# Patient Record
Sex: FEMALE | Race: WHITE | NOT HISPANIC OR LATINO | Employment: OTHER | URBAN - METROPOLITAN AREA
[De-identification: names, ages, dates, MRNs, and addresses within clinical notes are randomized per-mention and may not be internally consistent; named-entity substitution may affect disease eponyms.]

---

## 2017-04-18 PROBLEM — C50.412 MALIGNANT NEOPLASM OF UPPER-OUTER QUADRANT OF LEFT FEMALE BREAST: Status: ACTIVE | Noted: 2017-04-18

## 2017-04-27 ENCOUNTER — TELEPHONE (OUTPATIENT)
Dept: PHARMACY | Facility: CLINIC | Age: 77
End: 2017-04-27

## 2017-05-01 NOTE — TELEPHONE ENCOUNTER
DOCUMENTATION ONLY  PA was submitted to ins  PA was approved until 4/25/2018    ASSISTANCE [employer]   ID: [id number]  GRP: [id number]  BIN: [id number]  PCN: [id number]  $15,000.00

## 2017-05-11 PROBLEM — N13.30 HYDRONEPHROSIS: Status: ACTIVE | Noted: 2017-05-11

## 2017-05-18 ENCOUNTER — OFFICE VISIT (OUTPATIENT)
Dept: VASCULAR SURGERY | Facility: CLINIC | Age: 77
End: 2017-05-18
Payer: MEDICARE

## 2017-05-18 VITALS
RESPIRATION RATE: 16 BRPM | HEART RATE: 86 BPM | HEIGHT: 61 IN | SYSTOLIC BLOOD PRESSURE: 121 MMHG | WEIGHT: 111 LBS | DIASTOLIC BLOOD PRESSURE: 74 MMHG | BODY MASS INDEX: 20.96 KG/M2

## 2017-05-18 DIAGNOSIS — I71.40 ABDOMINAL AORTIC ANEURYSM (AAA) WITHOUT RUPTURE: Primary | ICD-10-CM

## 2017-05-18 PROCEDURE — 99999 PR PBB SHADOW E&M-EST. PATIENT-LVL III: CPT | Mod: PBBFAC,,, | Performed by: THORACIC SURGERY (CARDIOTHORACIC VASCULAR SURGERY)

## 2017-05-18 PROCEDURE — 99205 OFFICE O/P NEW HI 60 MIN: CPT | Mod: S$PBB,,, | Performed by: THORACIC SURGERY (CARDIOTHORACIC VASCULAR SURGERY)

## 2017-05-18 PROCEDURE — 99213 OFFICE O/P EST LOW 20 MIN: CPT | Mod: PBBFAC,PO | Performed by: THORACIC SURGERY (CARDIOTHORACIC VASCULAR SURGERY)

## 2017-05-18 NOTE — PROGRESS NOTES
OFFICE VISIT NOTE    I was asked to see this patient by Dr. Collins.  The patient is a 76-year-old   female who was recently diagnosed with stage IV breast cancer.  The cancer has   invaded the skin in the left axilla and she is not a candidate for surgery at   this time.  A CT-PET scan was done and this revealed a 4.7 cm abdominal aortic   aneurysm.  The patient had been experiencing right-sided abdominal and back   pain.  CT scan showed a right hydronephrosis with what was thought to be a   ureteropelvic junction obstruction.  She was seen by a urologist who placed a   stent in the right ureter.  The pain has pretty much resolved.  She is to see   the urologist tomorrow to see whether she needs an operation.    PAST MEDICAL HISTORY:  Stage IV left breast carcinoma, hypertension, macular   degeneration, stroke, tobacco abuse.    PAST SURGICAL HISTORY:  Hysterectomy, cholecystectomy, appendectomy.    MEDICATIONS:  Norvasc, aspirin, Omnicef, Femara, Prinivil, Ibrance.    ALLERGIES:  Sulfa, codeine.    FAMILY HISTORY:  Abdominal aortic aneurysm in her sister, coronary artery   disease and diabetes mellitus.    SOCIAL HISTORY:  She used to smoke two packs of cigarettes per day, but is down   to one pack of cigarettes per day now.  She denies alcohol use.    REVISION OF SYSTEMS:  She states that she has a mass in the skin of the left   axilla overlying the tail of the breast.  She also complained of recent   right-sided abdominal and back pain.  She states that she has very little   appetite and is not eating much, but states that she has rarely weighed over 100   pounds throughout her life.  All other systems are reviewed and are negative.    PHYSICAL EXAMINATION:  VITAL SIGNS:  Blood pressure is 121/74, respiratory rate is 16, heart rate is   86, height 5 feet 1 inch, weight 111 pounds.  GENERAL:  She is awake and alert, in no apparent distress.  HEENT:  Head is normocephalic.  Pupils equal, round and reactive.   Sclerae   anicteric.  NECK:  Supple.  Trachea midline.  No masses.  LUNGS:  Clear.  BREASTS:  She has a mass approximately 4 cm in diameter overlying the tail of   the left breast and into the skin of the left axilla.  This mass looks like   multiple small masses invading the skin and is indurated and fixed to the chest   wall.  ABDOMEN:  Soft and nontender.  I can palpate an abdominal aortic aneurysm   measuring approximately 4 to 5 cm in diameter.  EXTREMITIES:  Warm with good capillary refill.  Pulse examination, 2+ brachial,   2+ radial pulses bilaterally.  She has 1+ right femoral pulse and a 2+ left   femoral pulse.  I cannot palpate dorsalis pedis, nor posterior tibial pulses in   either lower extremity.  NEUROLOGIC:  Awake, alert and oriented.  No lateralizing neurologic deficits.    CT scan showed a 4.7 cm abdominal aortic aneurysm.    IMPRESSION:  1.  Abdominal aortic aneurysm.  2.  Stage IV left breast cancer.  3.  Hypertension.  4.  Macular degeneration.  5.  History of cerebrovascular accident.  6.  Nicotine dependence.    RECOMMENDATIONS:  The patient has a 4.7 cm abdominal aortic aneurysm.  She also   has multiple other problems.  She had a right ureteropelvic junction obstruction   with hydronephrosis.  She recently was diagnosed with a urinary tract infection   and was treated with appropriate antibiotics.  A ureteral stent was placed and   the patient will be evaluated tomorrow to see whether she needs an operation.    She has stage IV left breast cancer.  At this point in time, I think that   everything else needs to be taken care of first.  The aneurysm is at a size   where it does not need to be addressed at this moment.  In this patient, I would   wait until it gets to around 5.5 as the [place] small abdominal aortic   aneurysm study and the study done here in the [place], showed that it is   safe to wait until aneurysms of both 5.5 cm.  The patient is certainly a very   poor  candidate.  We will get an ultrasound of the abdominal aorta in six months.      PRIYA  dd: 05/18/2017 18:00:35 (CDT)  td: 05/19/2017 14:32:48 (CDT)  Doc ID   #[id number]  Job ID #[id number]    CC:

## 2017-05-18 NOTE — MR AVS SNAPSHOT
[facility]  [address] [facility] [address] [place] [address]  Phone: [phone]                  Claudia Lerner   2017 3:00 PM   Office Visit    Description:  Female : 1940   Provider:  Atilio Portillo MD   Department:  [facility]           Reason for Visit     Aortic Aneurysm           Diagnoses this Visit        Comments    Abdominal aortic aneurysm (AAA) without rupture    -  Primary            To Do List           Goals (5 Years of Data)     None      [facility]     [facility] -  Assistance  Unless otherwise directed by your provider, please contact [facility], our nurse care line that is available for  assistance.     Registered nurses in the [facility] Center provide: appointment scheduling, clinical advisement, health education, and other advisory services.  Call: [phone] (toll free)               Medications           Message regarding Medications     Verify the changes and/or additions to your medication regime listed below are the same as discussed with your clinician today.  If any of these changes or additions are incorrect, please notify your healthcare provider.             Verify that the below list of medications is an accurate representation of the medications you are currently taking.  If none reported, the list may be blank. If incorrect, please contact your healthcare provider. Carry this list with you in case of emergency.           Current Medications     amlodipine (NORVASC) 10 MG tablet Take 10 mg by mouth once daily.    aspirin 81 MG Chew Take 81 mg by mouth once daily.    cefdinir (OMNICEF) 300 MG capsule TK 1 C PO Q 12 H FOR 7 DAYS    diazePAM (VALIUM) 2 MG tablet Take 1 tablet (2 mg total) by mouth once. Take before pet scan    letrozole (FEMARA) 2.5 mg Tab Take 1 tablet (2.5 mg total) by mouth once daily.    lisinopril (PRINIVIL,ZESTRIL) 5 MG tablet Take 5 mg by mouth once daily.     "palbociclib (IBRANCE) 125 mg Cap Take 1 capsule by mouth once daily. For 21 days, then take 7 days off           Clinical Reference Information           Your Vitals Were     BP Pulse Resp Height Weight BMI    121/74 86 16 5' 1" (1.549 m) 50.3 kg (111 lb) 20.97 kg/m2      Blood Pressure          Most Recent Value    BP  121/74      Allergies as of 5/18/2017     Sulfa (Sulfonamide Antibiotics)    Codeine      Immunizations Administered on Date of Encounter - 5/18/2017     None      [facility] Sign-Up     Activating your [facility] account is as easy as 1-2-3!     1) Visit [email or web address], select Sign Up Now, enter this activation code and your date of birth, then select Next.  [email or web address]  Expires: 6/2/2017  2:20 PM      2) Create a username and password to use when you visit [facility] in the future and select a security question in case you lose your password and select Next.    3) Enter your e-mail address and click Sign Up!    Additional Information  If you have questions, please e-mail [email or web address] or call [phone] to talk to our [facility] staff. Remember, [facility] is NOT to be used for urgent needs. For medical emergencies, dial 911.         Smoking Cessation     If you would like to quit smoking:   You may be eligible for free services if you are a [place] resident and started smoking cigarettes before September 1, 1988.  Call the Smoking Cessation Trust ([facility]) toll free at (453) 916-5258 or (161) 963-5425.   Call 1-800-QUIT-NOW if you do not meet the above criteria.   Contact us via email: [email or web address]   View our website for more information: [email or web address]        Language Assistance Services     ATTENTION: Language assistance services are available, free of charge. Please call [phone].      ATENCIÓN: Si habla español, tiene a livingston disposición servicios gratuitos de asistencia lingüística. Llame al [phone].     CHÚ Ý: N?u b?n nói Ti?ng Vi?t, có các d?ch " v? h? tr? ngôn ng? mi?n phí zebh cho b?n. G?i s? [phone].         [facility] [facility] complies with applicable Federal civil rights laws and does not discriminate on the basis of race, color, national origin, age, disability, or sex.

## 2017-05-18 NOTE — LETTER
May 18, 2017      Katlyn Collins MD  [address]  [address]  [facility] [address]           [place] [facility]  [address] [facility] [address] [address]  Phone: [phone]          Patient: Claudia Lerner   MR Number: [id number]   YOB: 1940   Date of Visit: 5/18/2017       Dear Dr. Katlny Collins:    Thank you for referring Claudia Lerner to me for evaluation. Attached you will find relevant portions of my assessment and plan of care.    If you have questions, please do not hesitate to call me. I look forward to following Claudia Lerner along with you.    Sincerely,    Atilio Portillo MD    Enclosure  CC:  No Recipients    If you would like to receive this communication electronically, please contact [email or web address] or [phone] to request more information on [employer] Link access.    For providers and/or their staff who would like to refer a patient to [facility], please contact us through our one-stop-shop provider referral line, [facility], at [phone].    If you feel you have received this communication in error or would no longer like to receive these types of communications, please e-mail [email or web address]

## 2017-05-25 DIAGNOSIS — I71.40 AAA (ABDOMINAL AORTIC ANEURYSM) WITHOUT RUPTURE: Primary | ICD-10-CM

## 2017-07-13 PROBLEM — F17.200 TOBACCO USE DISORDER: Status: ACTIVE | Noted: 2017-07-13

## 2017-07-13 PROBLEM — N28.9 RENAL INSUFFICIENCY: Status: ACTIVE | Noted: 2017-07-13

## 2017-07-13 PROBLEM — J96.01 ACUTE RESPIRATORY FAILURE WITH HYPOXEMIA: Status: ACTIVE | Noted: 2017-07-13

## 2017-07-13 PROBLEM — N81.10 BLADDER PROLAPSE, FEMALE, ACQUIRED: Status: ACTIVE | Noted: 2017-07-13

## 2017-07-13 PROBLEM — J96.01 ACUTE HYPOXEMIC RESPIRATORY FAILURE: Status: ACTIVE | Noted: 2017-07-13

## 2017-07-13 PROBLEM — I10 ESSENTIAL HYPERTENSION: Status: ACTIVE | Noted: 2017-07-13

## 2017-07-13 PROBLEM — D53.9 MACROCYTIC ANEMIA: Status: ACTIVE | Noted: 2017-07-13

## 2017-07-13 PROBLEM — J94.8 HYDROPNEUMOTHORAX: Status: ACTIVE | Noted: 2017-07-13

## 2017-07-13 PROBLEM — N18.30 STAGE 3 CHRONIC KIDNEY DISEASE: Status: ACTIVE | Noted: 2017-07-13

## 2017-07-16 PROBLEM — J93.9 PNEUMOTHORAX, LEFT: Status: ACTIVE | Noted: 2017-07-16

## 2017-07-19 ENCOUNTER — OFFICE VISIT (OUTPATIENT)
Dept: OBSTETRICS AND GYNECOLOGY | Facility: CLINIC | Age: 77
End: 2017-07-19
Payer: MEDICARE

## 2017-07-19 VITALS
BODY MASS INDEX: 20.62 KG/M2 | SYSTOLIC BLOOD PRESSURE: 96 MMHG | HEIGHT: 60 IN | WEIGHT: 105 LBS | DIASTOLIC BLOOD PRESSURE: 46 MMHG

## 2017-07-19 DIAGNOSIS — N81.10 VAGINAL PROLAPSE: Primary | ICD-10-CM

## 2017-07-19 PROCEDURE — 99205 OFFICE O/P NEW HI 60 MIN: CPT | Mod: 25,S$PBB,, | Performed by: SPECIALIST

## 2017-07-19 PROCEDURE — 1159F MED LIST DOCD IN RCRD: CPT | Mod: ,,, | Performed by: SPECIALIST

## 2017-07-19 PROCEDURE — 57160 INSERT PESSARY/OTHER DEVICE: CPT | Mod: PBBFAC,PN | Performed by: SPECIALIST

## 2017-07-19 PROCEDURE — 99213 OFFICE O/P EST LOW 20 MIN: CPT | Mod: PBBFAC,PN | Performed by: SPECIALIST

## 2017-07-19 PROCEDURE — 57160 INSERT PESSARY/OTHER DEVICE: CPT | Mod: S$PBB,,, | Performed by: SPECIALIST

## 2017-07-19 PROCEDURE — 1125F AMNT PAIN NOTED PAIN PRSNT: CPT | Mod: ,,, | Performed by: SPECIALIST

## 2017-07-19 PROCEDURE — 99999 PR PBB SHADOW E&M-EST. PATIENT-LVL III: CPT | Mod: PBBFAC,,, | Performed by: SPECIALIST

## 2017-07-19 RX ORDER — DORZOLAMIDE HYDROCHLORIDE AND TIMOLOL MALEATE 20; 5 MG/ML; MG/ML
SOLUTION/ DROPS OPHTHALMIC
Refills: 6 | COMMUNITY
Start: 2017-07-10 | End: 2017-07-20

## 2017-07-19 NOTE — PROGRESS NOTES
75 yo WF , h/o hysterectomy with diagnosed grade 3 vaginal prolapse and secondary urinary retention presents after recent hospital discharge for evaluation. Pearce cath in place.    Past Medical History:   Diagnosis Date    Adenocarcinoma     Hypertension     Macular degeneration     Stroke        Past Surgical History:   Procedure Laterality Date    APPENDECTOMY      CHOLECYSTECTOMY      HYSTERECTOMY         Family History   Problem Relation Age of Onset    Heart disease Mother     Heart disease Father     Cancer Father     Diabetes Father     Heart disease Sister     Aneurysm Son        Social History     Social History    Marital status:      Spouse name: N/A    Number of children: N/A    Years of education: N/A     Social History Main Topics    Smoking status: Current Every Day Smoker     Packs/day: 1.00    Smokeless tobacco: Never Used      Comment: denied TTS consult    Alcohol use No    Drug use: No    Sexual activity: Not Asked     Other Topics Concern    None     Social History Narrative    None       Current Outpatient Prescriptions   Medication Sig Dispense Refill    albuterol-ipratropium 2.5mg-0.5mg/3mL (DUO-NEB) 0.5 mg-3 mg(2.5 mg base)/3 mL nebulizer solution Take 3 mLs by nebulization every 6 (six) hours as needed for Wheezing. Rescue 120 vial 2    amlodipine (NORVASC) 10 MG tablet Take 10 mg by mouth once daily.      aspirin 81 MG Chew Take 81 mg by mouth once daily.      ibuprofen (ADVIL,MOTRIN) 800 MG tablet Take 800 mg by mouth every 6 (six) hours as needed for Pain.      letrozole (FEMARA) 2.5 mg Tab TAKE 1 TABLET(2.5 MG) BY MOUTH EVERY DAY 30 tablet 0    lisinopril (PRINIVIL,ZESTRIL) 5 MG tablet Take 5 mg by mouth once daily.      ondansetron (ZOFRAN-ODT) 8 MG TbDL Take 1 tablet (8 mg total) by mouth every 6 (six) hours as needed. 20 tablet 3    oxybutynin (DITROPAN) 5 MG Tab Take 5 mg by mouth 3 (three) times daily.      palbociclib (IBRANCE) 125 mg Cap  Take 1 capsule by mouth once daily. For 21 days, then take 7 days off 21 capsule 6    dorzolamide-timolol 2-0.5% (COSOPT) 22.3-6.8 mg/mL ophthalmic solution PLACE 1 DROP IN BOTH EYES BID  6     No current facility-administered medications for this visit.        Review of patient's allergies indicates:   Allergen Reactions    Sulfa (sulfonamide antibiotics) Other (See Comments)     unknown    Codeine Palpitations       Review of System:   General: no chills, fever, night sweats, weight gain or weight loss  Psychological: no depression or suicidal ideation  Breasts: no new or changing breast lumps, nipple discharge or masses.  Respiratory: no cough, shortness of breath, or wheezing  Cardiovascular: no chest pain or dyspnea on exertion  Gastrointestinal: no abdominal pain, change in bowel habits, or black or bloody stools  Genito-Urinary: COMPLETE VAGINAL PROLAPSE  Musculoskeletal: no gait disturbance or muscular weakness    PE:   APPEARANCE: Well nourished, well developed, in no acute distress.  NECK: Neck symmetric without masses or thyromegaly.  NODES: No inguinal lymph node enlargement.  ABDOMEN: Soft. No tenderness or masses. No hepatosplenomegaly. No hernias.  BREASTS: Symmetrical, no skin changes or visible lesions. No palpable masses, nipple discharge or adenopathy bilaterally.  PELVIC: Normal external female genitalia without lesions. Normal hair distribution. Adequate perineal body, normal urethral meatus. Vagina DRY AND ATROPHIC WITH COMPLETE VAGINAL VAULT PROLAPSE.    Procedure   Vaginal Pessary fitting    After informed consent, pt in supine position, 36 Ring pessary placed high vaginal vault after manual reduction of prolapsed vagina. Excellent fit with good and terior and vault support, Neg Valsava and pt states tolerable.    I discussed pessary maintinence with family members and pessary cleaning and replacement instructions given.  Cleaning instructions given    Ref [id number]  Lot [id number]    Plan Clean  and replace q 8 weeks  If not able to perform then RTO

## 2017-07-20 PROBLEM — E87.6 HYPOKALEMIA: Status: ACTIVE | Noted: 2017-07-20

## 2017-07-20 PROBLEM — I48.91 ATRIAL FIBRILLATION: Status: ACTIVE | Noted: 2017-07-20

## 2017-07-20 PROBLEM — I48.91 A-FIB: Status: ACTIVE | Noted: 2017-07-20

## 2017-07-21 PROBLEM — R53.1 WEAKNESS: Status: ACTIVE | Noted: 2017-07-21

## 2017-07-26 PROBLEM — I48.0 PAROXYSMAL ATRIAL FIBRILLATION: Status: ACTIVE | Noted: 2017-07-20

## 2017-07-27 ENCOUNTER — TELEPHONE (OUTPATIENT)
Dept: CARDIOLOGY | Facility: CLINIC | Age: 77
End: 2017-07-27

## 2017-07-27 NOTE — TELEPHONE ENCOUNTER
----- Message from Olegario Reyes sent at 7/27/2017  8:57 AM CDT -----  Contact: Floresita chao/Tierney  Pt needs to schedule a 6-week [facility] f/u appt  Call Back#[phone] ext [phone]  Thanks

## 2018-02-14 PROBLEM — R52 PAIN: Status: ACTIVE | Noted: 2018-02-14

## 2018-02-15 PROBLEM — A41.9 SEPSIS DUE TO URINARY TRACT INFECTION: Status: ACTIVE | Noted: 2018-02-15

## 2018-02-15 PROBLEM — N39.0 SEPSIS DUE TO URINARY TRACT INFECTION: Status: ACTIVE | Noted: 2018-02-15

## 2018-02-15 PROBLEM — I48.20 CHRONIC ATRIAL FIBRILLATION: Status: ACTIVE | Noted: 2017-07-20

## 2018-02-15 PROBLEM — N39.0 COMPLICATED UTI (URINARY TRACT INFECTION): Status: ACTIVE | Noted: 2018-02-15

## 2018-02-16 PROBLEM — D62 ACUTE BLOOD LOSS ANEMIA: Status: ACTIVE | Noted: 2018-02-16

## 2018-02-16 PROBLEM — N13.4: Status: ACTIVE | Noted: 2018-02-16

## 2018-03-16 PROBLEM — I73.9 PVD (PERIPHERAL VASCULAR DISEASE): Status: ACTIVE | Noted: 2018-03-16

## 2018-04-18 ENCOUNTER — OFFICE VISIT (OUTPATIENT)
Dept: VASCULAR SURGERY | Facility: CLINIC | Age: 78
End: 2018-04-18
Payer: MEDICARE

## 2018-04-18 VITALS
DIASTOLIC BLOOD PRESSURE: 67 MMHG | BODY MASS INDEX: 20.03 KG/M2 | SYSTOLIC BLOOD PRESSURE: 143 MMHG | HEART RATE: 57 BPM | WEIGHT: 102 LBS | HEIGHT: 60 IN

## 2018-04-18 DIAGNOSIS — I71.40 ABDOMINAL AORTIC ANEURYSM (AAA) WITHOUT RUPTURE: Primary | ICD-10-CM

## 2018-04-18 DIAGNOSIS — I74.5 ILIAC ARTERY OCCLUSION: ICD-10-CM

## 2018-04-18 DIAGNOSIS — I71.20 THORACIC AORTIC ANEURYSM WITHOUT RUPTURE: ICD-10-CM

## 2018-04-18 PROCEDURE — 99999 PR PBB SHADOW E&M-EST. PATIENT-LVL III: CPT | Mod: PBBFAC,,, | Performed by: THORACIC SURGERY (CARDIOTHORACIC VASCULAR SURGERY)

## 2018-04-18 PROCEDURE — 99213 OFFICE O/P EST LOW 20 MIN: CPT | Mod: PBBFAC,PO | Performed by: THORACIC SURGERY (CARDIOTHORACIC VASCULAR SURGERY)

## 2018-04-18 PROCEDURE — 99024 POSTOP FOLLOW-UP VISIT: CPT | Mod: POP,,, | Performed by: THORACIC SURGERY (CARDIOTHORACIC VASCULAR SURGERY)

## 2018-04-18 RX ORDER — CIPROFLOXACIN 500 MG/1
500 TABLET ORAL 2 TIMES DAILY
Refills: 1 | COMMUNITY
Start: 2018-04-13 | End: 2018-07-23

## 2018-04-18 RX ORDER — PHENYLPROPANOLAMINE/CLEMASTINE 75-1.34MG
TABLET, EXTENDED RELEASE ORAL
COMMUNITY
End: 2018-07-23

## 2018-04-18 RX ORDER — PHENAZOPYRIDINE HYDROCHLORIDE 200 MG/1
200 TABLET, FILM COATED ORAL 3 TIMES DAILY PRN
Refills: 6 | COMMUNITY
Start: 2018-04-04 | End: 2018-07-23

## 2018-04-19 NOTE — PROGRESS NOTES
OFFICE NOTE    HISTORY OF PRESENT ILLNESS:  This is a 77-year-old lady with an abdominal aortic   aneurysm, the size of which is unclear.  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  According to the   son, she was told that the aneurysm has grown to at least 5 cm in greatest   transverse dimension, but she does not have any details.  I have the angiogram,   but not the CT scan to my office.  Medicines are noted, they are part of the   recent EPIC record.  Her problem list is reviewed.    PHYSICAL EXAMINATION:  GENERAL:  She is awake, alert, in no distress.  She recently had a fracture of   the right upper extremity and this is being treated with a sling.  HEENT:  Pupils are equal, round, reactive to light.  Nose and throat are clear.  NECK:  Supple.  CHEST:  Clear to auscultation.  HEART:  Regular rate and rhythm.  ABDOMEN:  Benign.  EXTREMITIES:  Femoral pulses are diminished as well as pedal pulses.    The recent angiogram was reviewed.  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  She appears to   be asymptomatic.    PLAN:  At this point, I recommend CTA of the chest and abdomen and based on the   dimensions of the aneurysm will make further recommendations.      MANDIE  dd: 04/18/2018 14:22:13 (CDT)  td: 04/19/2018 03:59:23 (CDT)  Doc ID   #[id number]  Job ID #[id number]    CC:

## 2018-05-01 ENCOUNTER — TELEPHONE (OUTPATIENT)
Dept: VASCULAR SURGERY | Facility: CLINIC | Age: 78
End: 2018-05-01

## 2018-05-01 NOTE — TELEPHONE ENCOUNTER
Informed son we will call with surgical recommendations after review of study by Dr. Rincon and Endovascular rep. States understanding.

## 2018-05-01 NOTE — TELEPHONE ENCOUNTER
----- Message from Ana Cali sent at 5/1/2018  9:45 AM CDT -----  Contact: Floresita (Home Health Nurse)  Patient had a CT scan done on 04/24/18 at [facility] and is asking for results. Please advise.   Call back  or patient son  (Tej Lerner)  Thanks!

## 2018-07-24 ENCOUNTER — TELEPHONE (OUTPATIENT)
Dept: VASCULAR SURGERY | Facility: CLINIC | Age: 78
End: 2018-07-24

## 2018-07-27 PROBLEM — N13.30 HYDRONEPHROSIS OF RIGHT KIDNEY: Status: ACTIVE | Noted: 2018-07-27

## 2018-11-28 ENCOUNTER — TELEPHONE (OUTPATIENT)
Dept: ADMINISTRATIVE | Facility: CLINIC | Age: 78
End: 2018-11-28

## 2018-11-28 NOTE — TELEPHONE ENCOUNTER
Home Health Recert 11/22/2018 to 01/20/2019 with [facility], Dr Dion Aparicio.  service.

## 2019-02-04 ENCOUNTER — TELEPHONE (OUTPATIENT)
Dept: ADMINISTRATIVE | Facility: CLINIC | Age: 79
End: 2019-02-04

## 2019-02-04 NOTE — TELEPHONE ENCOUNTER
Home Health Recert with [facility]-Dr. Dion Aparicio. Pt received SN and PT services.

## 2019-06-17 ENCOUNTER — EXTERNAL HOME HEALTH (OUTPATIENT)
Dept: HOME HEALTH SERVICES | Facility: HOSPITAL | Age: 79
End: 2019-06-17

## 2020-05-26 ENCOUNTER — HOSPITAL ENCOUNTER (OUTPATIENT)
Dept: RADIOLOGY | Facility: HOSPITAL | Age: 80
Discharge: HOME | End: 2020-05-26
Attending: SPECIALIST
Payer: MEDICARE

## 2020-05-26 ENCOUNTER — HOSPITAL ENCOUNTER (OUTPATIENT)
Dept: PREADMISSION TESTING | Facility: HOSPITAL | Age: 80
Discharge: HOME | End: 2020-05-26
Attending: SPECIALIST
Payer: MEDICARE

## 2020-05-26 VITALS
HEART RATE: 56 BPM | DIASTOLIC BLOOD PRESSURE: 81 MMHG | SYSTOLIC BLOOD PRESSURE: 122 MMHG | OXYGEN SATURATION: 96 % | BODY MASS INDEX: 21.6 KG/M2 | RESPIRATION RATE: 18 BRPM | TEMPERATURE: 99 F | HEIGHT: 60 IN | WEIGHT: 110 LBS

## 2020-05-26 DIAGNOSIS — Z41.9 SURGERY, ELECTIVE: Primary | ICD-10-CM

## 2020-05-26 DIAGNOSIS — Z01.811 PRE-OP CHEST EXAM: ICD-10-CM

## 2020-05-26 DIAGNOSIS — Z01.811 PRE-OP CHEST EXAM: Primary | ICD-10-CM

## 2020-05-26 PROCEDURE — U0003 INFECTIOUS AGENT DETECTION BY NUCLEIC ACID (DNA OR RNA); SEVERE ACUTE RESPIRATORY SYNDROME CORONAVIRUS 2 (SARS-COV-2) (CORONAVIRUS DISEASE [COVID-19]), AMPLIFIED PROBE TECHNIQUE, MAKING USE OF HIGH THROUGHPUT TECHNOLOGIES AS DESCRIBED BY CMS-2020-01-R: HCPCS

## 2020-05-26 PROCEDURE — 71046 X-RAY EXAM CHEST 2 VIEWS: CPT | Mod: TC

## 2020-05-26 PROCEDURE — 93005 ELECTROCARDIOGRAM TRACING: CPT | Performed by: INTERNAL MEDICINE

## 2020-05-26 RX ORDER — CEFAZOLIN SODIUM 1 G/3ML
2 INJECTION, POWDER, FOR SOLUTION INTRAMUSCULAR; INTRAVENOUS ONCE
Status: CANCELLED | OUTPATIENT
Start: 2020-05-28

## 2020-05-27 LAB — SARS-COV-2 RNA RESP QL NAA+PROBE: NOT DETECTED

## 2020-05-28 ENCOUNTER — HOSPITAL ENCOUNTER (OUTPATIENT)
Dept: RADIOLOGY | Facility: HOSPITAL | Age: 80
Discharge: HOME | End: 2020-05-28
Attending: SPECIALIST | Admitting: SPECIALIST
Payer: MEDICARE

## 2020-05-28 ENCOUNTER — ANESTHESIA EVENT (OUTPATIENT)
Dept: SURGERY | Facility: HOSPITAL | Age: 80
End: 2020-05-28
Payer: MEDICARE

## 2020-05-28 ENCOUNTER — ANESTHESIA (OUTPATIENT)
Dept: SURGERY | Facility: HOSPITAL | Age: 80
End: 2020-05-28
Payer: MEDICARE

## 2020-05-28 ENCOUNTER — HOSPITAL ENCOUNTER (OUTPATIENT)
Facility: HOSPITAL | Age: 80
Discharge: HOME | End: 2020-05-28
Attending: SPECIALIST | Admitting: SPECIALIST
Payer: MEDICARE

## 2020-05-28 VITALS
WEIGHT: 110 LBS | OXYGEN SATURATION: 99 % | BODY MASS INDEX: 21.6 KG/M2 | RESPIRATION RATE: 17 BRPM | SYSTOLIC BLOOD PRESSURE: 149 MMHG | DIASTOLIC BLOOD PRESSURE: 78 MMHG | HEART RATE: 62 BPM | HEIGHT: 60 IN | TEMPERATURE: 98 F

## 2020-05-28 DIAGNOSIS — R10.9 PAIN IN THE ABDOMEN: ICD-10-CM

## 2020-05-28 DIAGNOSIS — Z41.9 SURGERY, ELECTIVE: ICD-10-CM

## 2020-05-28 DIAGNOSIS — N13.30 HYDRONEPHROSIS, UNSPECIFIED HYDRONEPHROSIS TYPE: Primary | ICD-10-CM

## 2020-05-28 PROCEDURE — 71000015 HC POSTOP RECOV 1ST HR: Performed by: SPECIALIST

## 2020-05-28 PROCEDURE — S0028 INJECTION, FAMOTIDINE, 20 MG: HCPCS | Performed by: NURSE ANESTHETIST, CERTIFIED REGISTERED

## 2020-05-28 PROCEDURE — 37000009 HC ANESTHESIA EA ADD 15 MINS: Performed by: SPECIALIST

## 2020-05-28 PROCEDURE — 27202107 HC XP QUATRO SENSOR: Performed by: ANESTHESIOLOGY

## 2020-05-28 PROCEDURE — 27201423 OPTIME MED/SURG SUP & DEVICES STERILE SUPPLY: Performed by: SPECIALIST

## 2020-05-28 PROCEDURE — 27000673 HC TUBING BLOOD Y: Performed by: ANESTHESIOLOGY

## 2020-05-28 PROCEDURE — 76000 FLUOROSCOPY <1 HR PHYS/QHP: CPT | Mod: TC

## 2020-05-28 PROCEDURE — 25000003 PHARM REV CODE 250: Performed by: NURSE ANESTHETIST, CERTIFIED REGISTERED

## 2020-05-28 PROCEDURE — C1769 GUIDE WIRE: HCPCS | Performed by: SPECIALIST

## 2020-05-28 PROCEDURE — 36000707: Performed by: SPECIALIST

## 2020-05-28 PROCEDURE — 25000003 PHARM REV CODE 250: Performed by: ANESTHESIOLOGY

## 2020-05-28 PROCEDURE — 27000671 HC TUBING MICROBORE EXT: Performed by: ANESTHESIOLOGY

## 2020-05-28 PROCEDURE — 63600175 PHARM REV CODE 636 W HCPCS: Performed by: NURSE ANESTHETIST, CERTIFIED REGISTERED

## 2020-05-28 PROCEDURE — 27200651 HC AIRWAY, LMA: Performed by: ANESTHESIOLOGY

## 2020-05-28 PROCEDURE — 63600175 PHARM REV CODE 636 W HCPCS: Performed by: SPECIALIST

## 2020-05-28 PROCEDURE — 25500020 PHARM REV CODE 255: Performed by: SPECIALIST

## 2020-05-28 PROCEDURE — 37000008 HC ANESTHESIA 1ST 15 MINUTES: Performed by: SPECIALIST

## 2020-05-28 PROCEDURE — 25000003 PHARM REV CODE 250: Performed by: SPECIALIST

## 2020-05-28 PROCEDURE — 71000033 HC RECOVERY, INTIAL HOUR: Performed by: SPECIALIST

## 2020-05-28 PROCEDURE — C2617 STENT, NON-COR, TEM W/O DEL: HCPCS | Performed by: SPECIALIST

## 2020-05-28 PROCEDURE — 36000706: Performed by: SPECIALIST

## 2020-05-28 DEVICE — STENT URETERAL FIRM 4.8FX26 ASCERTA: Type: IMPLANTABLE DEVICE | Site: URETER | Status: FUNCTIONAL

## 2020-05-28 RX ORDER — LIDOCAINE HYDROCHLORIDE 20 MG/ML
JELLY TOPICAL
Status: DISCONTINUED | OUTPATIENT
Start: 2020-05-28 | End: 2020-05-28 | Stop reason: HOSPADM

## 2020-05-28 RX ORDER — PROPOFOL 10 MG/ML
VIAL (ML) INTRAVENOUS
Status: DISCONTINUED | OUTPATIENT
Start: 2020-05-28 | End: 2020-05-28

## 2020-05-28 RX ORDER — LIDOCAINE HYDROCHLORIDE 20 MG/ML
JELLY TOPICAL
Status: DISCONTINUED | OUTPATIENT
Start: 2020-05-28 | End: 2020-05-28

## 2020-05-28 RX ORDER — DEXAMETHASONE SODIUM PHOSPHATE 4 MG/ML
INJECTION, SOLUTION INTRA-ARTICULAR; INTRALESIONAL; INTRAMUSCULAR; INTRAVENOUS; SOFT TISSUE
Status: DISCONTINUED | OUTPATIENT
Start: 2020-05-28 | End: 2020-05-28

## 2020-05-28 RX ORDER — OXYCODONE HYDROCHLORIDE 5 MG/1
5 TABLET ORAL
Status: DISCONTINUED | OUTPATIENT
Start: 2020-05-28 | End: 2020-05-28 | Stop reason: HOSPADM

## 2020-05-28 RX ORDER — ACETAMINOPHEN 325 MG/1
650 TABLET ORAL ONCE
Status: COMPLETED | OUTPATIENT
Start: 2020-05-28 | End: 2020-05-28

## 2020-05-28 RX ORDER — DIPHENHYDRAMINE HYDROCHLORIDE 50 MG/ML
12.5 INJECTION INTRAMUSCULAR; INTRAVENOUS
Status: DISCONTINUED | OUTPATIENT
Start: 2020-05-28 | End: 2020-05-28 | Stop reason: HOSPADM

## 2020-05-28 RX ORDER — LIDOCAINE HYDROCHLORIDE 20 MG/ML
INJECTION, SOLUTION EPIDURAL; INFILTRATION; INTRACAUDAL; PERINEURAL
Status: DISCONTINUED | OUTPATIENT
Start: 2020-05-28 | End: 2020-05-28

## 2020-05-28 RX ORDER — SODIUM CHLORIDE, SODIUM LACTATE, POTASSIUM CHLORIDE, CALCIUM CHLORIDE 600; 310; 30; 20 MG/100ML; MG/100ML; MG/100ML; MG/100ML
INJECTION, SOLUTION INTRAVENOUS CONTINUOUS PRN
Status: DISCONTINUED | OUTPATIENT
Start: 2020-05-28 | End: 2020-05-28

## 2020-05-28 RX ORDER — SODIUM CHLORIDE 0.9 % (FLUSH) 0.9 %
10 SYRINGE (ML) INJECTION
Status: DISCONTINUED | OUTPATIENT
Start: 2020-05-28 | End: 2020-05-28 | Stop reason: HOSPADM

## 2020-05-28 RX ORDER — FAMOTIDINE 10 MG/ML
INJECTION INTRAVENOUS
Status: DISCONTINUED | OUTPATIENT
Start: 2020-05-28 | End: 2020-05-28

## 2020-05-28 RX ORDER — ONDANSETRON 2 MG/ML
INJECTION INTRAMUSCULAR; INTRAVENOUS
Status: DISCONTINUED | OUTPATIENT
Start: 2020-05-28 | End: 2020-05-28

## 2020-05-28 RX ORDER — FENTANYL CITRATE 50 UG/ML
25 INJECTION, SOLUTION INTRAMUSCULAR; INTRAVENOUS
Status: DISCONTINUED | OUTPATIENT
Start: 2020-05-28 | End: 2020-05-28 | Stop reason: HOSPADM

## 2020-05-28 RX ORDER — CEFAZOLIN SODIUM 2 G/50ML
2 SOLUTION INTRAVENOUS
Status: COMPLETED | OUTPATIENT
Start: 2020-05-28 | End: 2020-05-28

## 2020-05-28 RX ORDER — CEFAZOLIN SODIUM 1 G/3ML
2 INJECTION, POWDER, FOR SOLUTION INTRAMUSCULAR; INTRAVENOUS ONCE
Status: DISCONTINUED | OUTPATIENT
Start: 2020-05-28 | End: 2020-05-28

## 2020-05-28 RX ORDER — ONDANSETRON 2 MG/ML
4 INJECTION INTRAMUSCULAR; INTRAVENOUS DAILY PRN
Status: DISCONTINUED | OUTPATIENT
Start: 2020-05-28 | End: 2020-05-28 | Stop reason: HOSPADM

## 2020-05-28 RX ORDER — FENTANYL CITRATE 50 UG/ML
INJECTION, SOLUTION INTRAMUSCULAR; INTRAVENOUS
Status: DISCONTINUED | OUTPATIENT
Start: 2020-05-28 | End: 2020-05-28

## 2020-05-28 RX ORDER — TRAMADOL HYDROCHLORIDE 50 MG/1
50 TABLET ORAL EVERY 12 HOURS PRN
Qty: 10 TABLET | Refills: 0
Start: 2020-05-28 | End: 2020-10-26

## 2020-05-28 RX ADMIN — LIDOCAINE HYDROCHLORIDE 5 ML: 20 JELLY TOPICAL at 01:05

## 2020-05-28 RX ADMIN — ACETAMINOPHEN 650 MG: 325 TABLET ORAL at 01:05

## 2020-05-28 RX ADMIN — PROPOFOL 80 MG: 10 INJECTION, EMULSION INTRAVENOUS at 01:05

## 2020-05-28 RX ADMIN — ONDANSETRON 4 MG: 2 INJECTION INTRAMUSCULAR; INTRAVENOUS at 01:05

## 2020-05-28 RX ADMIN — DEXAMETHASONE SODIUM PHOSPHATE 4 MG: 4 INJECTION, SOLUTION INTRAMUSCULAR; INTRAVENOUS at 01:05

## 2020-05-28 RX ADMIN — CEFAZOLIN SODIUM 2 G: 2 SOLUTION INTRAVENOUS at 01:05

## 2020-05-28 RX ADMIN — LIDOCAINE HYDROCHLORIDE 60 MG: 20 INJECTION, SOLUTION EPIDURAL; INFILTRATION; INTRACAUDAL; PERINEURAL at 01:05

## 2020-05-28 RX ADMIN — FENTANYL CITRATE 25 MCG: 50 INJECTION INTRAMUSCULAR; INTRAVENOUS at 01:05

## 2020-05-28 RX ADMIN — FAMOTIDINE 20 MG: 10 INJECTION, SOLUTION INTRAVENOUS at 01:05

## 2020-05-28 RX ADMIN — SODIUM CHLORIDE, SODIUM LACTATE, POTASSIUM CHLORIDE, AND CALCIUM CHLORIDE: .6; .31; .03; .02 INJECTION, SOLUTION INTRAVENOUS at 01:05

## 2020-05-28 NOTE — PLAN OF CARE
Transported to Room 1551 in [facility] in stable condition.  Denies pain.  Daughter at bedside.  Report given to KATHI Christianson

## 2020-05-28 NOTE — PLAN OF CARE
Arrived in PACU s/p cysto with retrograde pyelogram and stent exchange on right.  Pt arouses to verbals.  Denies pain.

## 2020-05-28 NOTE — H&P
[facility]  History & Physical    SUBJECTIVE:     Chief Complaint/Reason for Admission:     History of Present Illness:  Patient is a 79 y.o. female presents with history of right-sided hydronephrosis  She has had a long-term stent by 2 other urologists    Here today for stent change    Also do a retrograde pyelogram    PTA Medications   Medication Sig    letrozole (FEMARA) 2.5 mg Tab TAKE 1 TABLET(2.5 MG) BY MOUTH  Daily (Patient taking differently: 2.5 mg once daily TAKE 1 TABLET(2.5 MG) BY MOUTH  Daily.)    MULTIVIT WITH MINERALS/LUTEIN (MULTIVITAMIN 50 PLUS ORAL) Take by mouth every evening.     sodium bicarbonate 650 MG tablet Take 650 mg by mouth 2 (two) times daily.     timolol maleate 0.25% (TIMOPTIC) 0.25 % Drop 1 drop 2 (two) times daily.    ALPRAZolam (XANAX) 0.25 MG tablet Take 1 tablet (0.25 mg total) by mouth daily as needed for Anxiety.    aspirin 81 MG Chew Take 81 mg by mouth once daily.    clopidogrel (PLAVIX) 75 mg tablet Take 75 mg by mouth once daily.    dronedarone (MULTAQ) 400 mg Tab Take 1 tablet (400 mg total) by mouth 2 (two) times daily.    oxybutynin (DITROPAN) 5 MG Tab Take 1 tablet (5 mg total) by mouth once daily.    pravastatin (PRAVACHOL) 40 MG tablet Take 1 tablet (40 mg total) by mouth once daily.       Review of patient's allergies indicates:   Allergen Reactions    Sulfa (sulfonamide antibiotics) Other (See Comments)     unknown    Codeine Palpitations       Past Medical History:   Diagnosis Date    A-fib 2017    history    AAA (abdominal aortic aneurysm) 2017    5.2 cm    Acquired hydroureter 2018    Adenocarcinoma 2017    left breast cancer , metastatic no chemo or surgery    Anemia     Aneurysm of thoracic aorta 03/16/2018    Carotid stenosis 2018    Cataract     COPD (chronic obstructive pulmonary disease) 2017    emphysema    Diarrhea     Fatigue     Glaucoma 2015    History of prolapse of bladder 2017    Hydronephrosis 2017     Hydropneumothorax 2017    LEFT    Hyperlipidemia 2019    Hypertension 2017    ovarian mass was removed    Hypokalemia     Hypotension     Kidney mass 2017    Macular degeneration 2011    PAD (peripheral artery disease)     PVD (peripheral vascular disease) 2018    Renal insufficiency 2017    Sepsis due to urinary tract infection 2017    Stroke 2017    no resisdual    Ureteral obstruction, right 2017    Urinary incontinence 2005    UTI (urinary tract infection)      Past Surgical History:   Procedure Laterality Date    aortagram with runoff  03/16/2018    APPENDECTOMY  1965    CHOLECYSTECTOMY  1980    CYSTOSCOPY W/ URETERAL STENT PLACEMENT Right 7/27/2018    Procedure: CYSTOSCOPY, WITH URETERAL STENT INSERTION;  Surgeon: Tremaine Victor MD;  Location: [facility]  Service: Urology;  Laterality: Right;  NORI VIDEO CONFIRMED 7/10/DME    CYSTOSCOPY W/ URETERAL STENT PLACEMENT Right 11/16/2018    Procedure: CYSTOSCOPY, WITH URETERAL STENT INSERTION;  Surgeon: Tremaine Victor MD;  Location: [facility] OR;  Service: Urology;  Laterality: Right;  STYKER CONFIRMED 11/7/DME    CYSTOSCOPY W/ URETERAL STENT PLACEMENT Right 5/24/2019    Procedure: CYSTOSCOPY, WITH URETERAL STENT INSERTION;  Surgeon: Tremaine Victor MD;  Location: [facility]  Service: Urology;  Laterality: Right;  nori video confirmed 5/17/dme    CYSTOSCOPY W/ URETERAL STENT PLACEMENT Right 9/6/2019    Procedure: CYSTOSCOPY, WITH URETERAL STENT INSERTION;  Surgeon: Tremaine Victor MD;  Location: [facility] OR;  Service: Urology;  Laterality: Right;    CYSTOSCOPY W/ URETERAL STENT PLACEMENT Right 12/6/2019    Procedure: CYSTOSCOPY, WITH URETERAL STENT INSERTION;  Surgeon: Tremaine Victor MD;  Location: [facility] OR;  Service: Urology;  Laterality: Right;  NORI VIDEO CONFIRMED DME    CYSTOSCOPY W/ URETERAL STENT REMOVAL Right 11/16/2018    Procedure: CYSTOSCOPY, WITH URETERAL STENT REMOVAL;  Surgeon: Tremaine Victor MD;  Location: [facility] OR;  Service:  Urology;  Laterality: Right;    CYSTOSCOPY W/ URETERAL STENT REMOVAL Right 5/24/2019    Procedure: CYSTOSCOPY, WITH URETERAL STENT REMOVAL;  Surgeon: Tremaine Victor MD;  Location: [facility] OR;  Service: Urology;  Laterality: Right;    CYSTOSCOPY W/ URETERAL STENT REMOVAL Right 9/6/2019    Procedure: CYSTOSCOPY, WITH URETERAL STENT REMOVAL;  Surgeon: Tremaine Victor MD;  Location: [facility] OR;  Service: Urology;  Laterality: Right;    CYSTOSCOPY W/ URETERAL STENT REMOVAL Right 12/6/2019    Procedure: CYSTOSCOPY, WITH URETERAL STENT REMOVAL;  Surgeon: Tremaine Victor MD;  Location: [facility] OR;  Service: Urology;  Laterality: Right;    cystoscopy with stent      HYSTERECTOMY  1965     Family History   Problem Relation Age of Onset    Heart disease Mother     Heart disease Father     Cancer Father     Diabetes Father     Heart disease Sister     Aneurysm Son      Social History     Tobacco Use    Smoking status: Former Smoker     Packs/day: 1.00     Types: Cigarettes     Last attempt to quit: 2017     Years since quitting: 3.4    Smokeless tobacco: Never Used    Tobacco comment: denied TTS consult   Substance Use Topics    Alcohol use: No    Drug use: No        Review of Systems:  Negative    OBJECTIVE:     Vital Signs (Most Recent):  Temp: 97.9 °F (36.6 °C) (05/28/20 1106)  Pulse: 70 (05/28/20 1106)  Resp: 18 (05/28/20 1106)  BP: (!) 157/75 (05/28/20 1106)  SpO2: 99 % (05/28/20 1106)    Inpatient Medications:  Current Facility-Administered Medications   Medication Dose Route Frequency Provider Last Rate Last Dose    cefazolin (ANCEF) 2 gram in dextrose 5% 50 mL IVPB (premix)  2 g Intravenous Once Pre-Op Mario Hernandez MD         Facility-Administered Medications Ordered in Other Encounters   Medication Dose Route Frequency Provider Last Rate Last Dose    lactated ringers infusion   Intravenous Continuous Tremaine Victor MD 75 mL/hr at 05/24/19 0730      lactated ringers infusion   Intravenous  Continuous Tremaine Victor MD        sodium chloride 0.9% flush 3 mL  3 mL Intravenous PRN Tremaine Victor MD        sodium chloride 0.9% flush 3 mL  3 mL Intravenous PRN Tremaine Victor MD              ASSESSMENT/PLAN:       Right side hydronephrosis      Patient has a stent      Cystoscopy with retrograde pyelogram

## 2020-05-28 NOTE — ANESTHESIA PROCEDURE NOTES
Intubation  Performed by: Seamus Rivas CRNA  Authorized by: Neftali Pérez MD     Intubation:     Induction:  Intravenous    Intubated:  Postinduction    Mask Ventilation:  N/a    Attempts:  1    Attempted By:  CRNA    Method of Intubation:  Blind intubation    Difficult Airway Encountered?: No      Complications:  None    Airway Device:  Supraglottic airway/LMA    Airway Device Size:  3.0    Style/Cuff Inflation:  Cuffed    Secured at:  The lips    Placement Verified By:  Capnometry    Complicating Factors:  None    Findings Post-Intubation:  BS equal bilateral

## 2020-05-28 NOTE — ANESTHESIA PREPROCEDURE EVALUATION
05/28/2020  Claudia Lerner is a 79 y.o., female.    Patient Active Problem List   Diagnosis    Malignant neoplasm of upper-outer quadrant of left female breast    Hydronephrosis    Acute hypoxemic respiratory failure    Hydropneumothorax    Renal insufficiency    Bladder prolapse, female, acquired    Essential hypertension    Macrocytic anemia    Tobacco use disorder    Acute respiratory failure with hypoxemia    Pneumothorax, left    Chronic atrial fibrillation    Hypokalemia    Paroxysmal atrial fibrillation    Weakness    Intractable pain    Sepsis due to urinary tract infection    Complicated UTI (urinary tract infection)    Acquired hydroureter    Acute blood loss anemia    PVD (peripheral vascular disease)    Hydronephrosis of right kidney       Past Surgical History:   Procedure Laterality Date    aortagram with runoff  03/16/2018    APPENDECTOMY  1965    CHOLECYSTECTOMY  1980    CYSTOSCOPY W/ URETERAL STENT PLACEMENT Right 7/27/2018    Procedure: CYSTOSCOPY, WITH URETERAL STENT INSERTION;  Surgeon: Tremaine Victor MD;  Location: [facility];  Service: Urology;  Laterality: Right;  NORI VIDEO CONFIRMED 7/10/DME    CYSTOSCOPY W/ URETERAL STENT PLACEMENT Right 11/16/2018    Procedure: CYSTOSCOPY, WITH URETERAL STENT INSERTION;  Surgeon: Tremaine Victor MD;  Location: [facility] OR;  Service: Urology;  Laterality: Right;  STYKER CONFIRMED 11/7/DME    CYSTOSCOPY W/ URETERAL STENT PLACEMENT Right 5/24/2019    Procedure: CYSTOSCOPY, WITH URETERAL STENT INSERTION;  Surgeon: Tremaine Victor MD;  Location: [facility] OR;  Service: Urology;  Laterality: Right;  nori video confirmed 5/17/dme    CYSTOSCOPY W/ URETERAL STENT PLACEMENT Right 9/6/2019    Procedure: CYSTOSCOPY, WITH URETERAL STENT INSERTION;  Surgeon: Tremaine Victor MD;  Location: [facility] OR;  Service: Urology;  Laterality:  Right;    CYSTOSCOPY W/ URETERAL STENT PLACEMENT Right 12/6/2019    Procedure: CYSTOSCOPY, WITH URETERAL STENT INSERTION;  Surgeon: Tremaine Victor MD;  Location: [facility] OR;  Service: Urology;  Laterality: Right;  NORI VIDEO CONFIRMED DME    CYSTOSCOPY W/ URETERAL STENT REMOVAL Right 11/16/2018    Procedure: CYSTOSCOPY, WITH URETERAL STENT REMOVAL;  Surgeon: Tremaine Victor MD;  Location: [facility] OR;  Service: Urology;  Laterality: Right;    CYSTOSCOPY W/ URETERAL STENT REMOVAL Right 5/24/2019    Procedure: CYSTOSCOPY, WITH URETERAL STENT REMOVAL;  Surgeon: Tremaine Victor MD;  Location: [facility] OR;  Service: Urology;  Laterality: Right;    CYSTOSCOPY W/ URETERAL STENT REMOVAL Right 9/6/2019    Procedure: CYSTOSCOPY, WITH URETERAL STENT REMOVAL;  Surgeon: Tremaine Victor MD;  Location: [facility] OR;  Service: Urology;  Laterality: Right;    CYSTOSCOPY W/ URETERAL STENT REMOVAL Right 12/6/2019    Procedure: CYSTOSCOPY, WITH URETERAL STENT REMOVAL;  Surgeon: Tremaine Victor MD;  Location: [facility] OR;  Service: Urology;  Laterality: Right;    cystoscopy with stent      HYSTERECTOMY  1965        Tobacco Use:  The patient  reports that she quit smoking about 3 years ago. Her smoking use included cigarettes. She smoked 1.00 pack per day. She has never used smokeless tobacco.     Results for orders placed or performed during the hospital encounter of 05/26/20   EKG 12-lead    Collection Time: 05/26/20  1:21 PM    Narrative    Test Reason : Z01.811,    Vent. Rate : 051 BPM     Atrial Rate : 051 BPM     P-R Int : 150 ms          QRS Dur : 074 ms      QT Int : 470 ms       P-R-T Axes : 046 -47 065 degrees     QTc Int : 433 ms    Sinus bradycardia  Left axis deviation  Nonspecific T wave abnormality  Abnormal ECG  No previous ECGs available  Confirmed by Mekhi Bro MD ([id number]) on 5/27/2020 9:17:20 PM    Referred By: GRAYSON ROSAS           Confirmed By:Mekhi Bro MD             Lab Results   Component Value Date    WBC  5.63 05/26/2020    HGB 10.9 (L) 05/26/2020    HCT 34.8 (L) 05/26/2020     (H) 05/26/2020     05/26/2020     BMP  Lab Results   Component Value Date     05/26/2020    K 3.6 05/26/2020     05/26/2020    CO2 22 (L) 05/26/2020    BUN 28 (H) 05/26/2020    CREATININE 1.6 (H) 05/26/2020    CALCIUM 9.3 05/26/2020    ANIONGAP 10 05/26/2020    ESTGFRAFRICA 34.6 (A) 05/26/2020    EGFRNONAA 30.0 (A) 05/26/2020             Pre-op Assessment    I have reviewed the Patient Summary Reports.     I have reviewed the Nursing Notes. I have reviewed the NPO Status.   I have reviewed the Medications.     Review of Systems  Anesthesia Hx:  Hx of Anesthetic complications (h/o disorientation once)  Denies Family Hx of Anesthesia complications.  Personal Hx of Anesthesia complications, Post-Operative Nausea/Vomiting, in the past, but not with recent anesthetics / prophylaxis   Social:  Former Smoker    Hematology/Oncology:  Hematology Normal   Oncology Normal     EENT/Dental:   Glaucoma and mac degen     Cardiovascular:   Hypertension Dysrhythmias atrial fibrillation PVD hyperlipidemia  Functional Capacity unable to determine   Denies Congenital Heart Disease.    Denies Congenital Heart Disease.   Denies Deep Venous Thrombosis (DVT)  Hypertension    Pulmonary:   COPD, moderate  Chronic Obstructive Pulmonary Disease (COPD):    Renal/:   Chronic Renal Disease, CRI renal calculi  Kidney Function/Disease, Chronic Kidney Disease (CKD)    Hepatic/GI:  Denies Liver Disease    Musculoskeletal:  Musculoskeletal Normal  Denies Rheumatic Disease    Neurological:   CVA, no residual symptoms  Denies Dx of Headaches Denies Seizure Disorder  CVA - Cerebrovasular Accident    Endocrine:  Endocrine Normal  Denies Diabetes    Psych:  Psychiatric Normal           Physical Exam  General:  Well nourished    Airway/Jaw/Neck:  Airway Findings: Mouth Opening: Normal General Airway Assessment: Adult  Mallampati: I  TM Distance: Normal, at  least 6 cm       Chest/Lungs:  Chest/Lungs Findings: Clear to auscultation     Heart/Vascular:  Heart Findings: Rhythm: Occasional Prematures        Mental Status:  Mental Status Findings:  Cooperative, Alert and Oriented         Anesthesia Plan  Type of Anesthesia, risks & benefits discussed:  Anesthesia Type:  general  Patient's Preference:   Intra-op Monitoring Plan: standard ASA monitors  Intra-op Monitoring Plan Comments:   Post Op Pain Control Plan: multimodal analgesia  Post Op Pain Control Plan Comments:   Induction:   IV  Beta Blocker:  Patient is not currently on a Beta-Blocker (No further documentation required).       Informed Consent: Patient understands risks and agrees with Anesthesia plan.  Questions answered. Anesthesia consent signed with patient.  ASA Score: 4     Day of Surgery Review of History & Physical: I have interviewed and examined the patient. I have reviewed the patient's H&P dated:    H&P update referred to the surgeon.     Anesthesia Plan Notes: LMA  No Versed and minimal fentanyl  Zofran, Pepcid, Decadron 4mg  Tylenol given        Ready For Surgery From Anesthesia Perspective.

## 2020-05-28 NOTE — OP NOTE
Operative Note         SUMMARY     Surgery Date:  5/28/2020     Assistant:     Indications:  This an 79-year-old female with a long history of right ureteral stricture  She had been treated by multiple urologists  Here for stent change    Pre-op Diagnosis:   Right ureteral stricture    Post-op Diagnosis:  Same    Procedure:  Cystoscopy with retrograde pyelogram and stent exchange    Anesthesia: General    Description of Procedure:   Patient brought to cystoscopy suite.  Placed in the cystoscopy position.  Patient is prepped and draped.  Anesthesia is given.  We enter the urinary bladder with the 21 fr cystoscope.  No lesions were found within the urinary bladder  The bladder appears to be within normal limits    Urinary orifices are normal  The stent was removed  We inject contrast into the right ureter  We observe what appears to be a poorly draining right kidney secondary to ureteral stricture  Glidewire is placed  We then float a 5 Kinyarwanda stent easily  Without any trouble whatsoever  This point telescope was removed  She is taken to recovery in satisfactory condition    Findings/Key Components:      Successful stent exchange    Estimated Blood Loss:      None

## 2020-05-28 NOTE — DISCHARGE SUMMARY
Patient comes in for routine cystoscopic stent exchange  Everything goes well without any problems whatsoever    Procedure is done w no complication    After a brief stay in recovery, patient is discharged in good condition    Meds given:  Ultram    F/u office:  3 weeks

## 2020-05-28 NOTE — TRANSFER OF CARE
Anesthesia Transfer of Care Note    Patient: Claudia Lerner    Procedure(s) Performed: Procedure(s) (LRB):  CYSTOSCOPY, WITH RETROGRADE PYELOGRAM (Right)  CYSTOSCOPY, WITH URETERAL STENT REMOVAL (Right)  CYSTOSCOPY, WITH URETERAL STENT INSERTION (Right)    Patient location: PACU    Anesthesia Type: general    Transport from OR: Transported from OR on room air with adequate spontaneous ventilation    Post pain: adequate analgesia    Post assessment: no apparent anesthetic complications    Post vital signs: stable    Level of consciousness: awake and alert    Nausea/Vomiting: no nausea/vomiting    Complications: none    Transfer of care protocol was followed      Last vitals:   Visit Vitals  BP (!) 157/75 (BP Location: Right arm, Patient Position: Sitting)   Pulse 70   Temp 36.6 °C (97.9 °F) (Oral)   Resp 18   Ht 5' (1.524 m)   Wt 49.9 kg (110 lb)   LMP  (LMP Unknown)   SpO2 99%   Breastfeeding? No   BMI 21.48 kg/m²

## 2020-05-28 NOTE — DISCHARGE INSTRUCTIONS
Cystoscopy    Cystoscopy is a procedure that lets your doctor look directly inside your urethra and bladder. It can be used to:  · Help diagnose a problem with your urethra, bladder, or kidneys.  · Take a sample (biopsy) of bladder or urethral tissue.  · Treat certain problems (such as removing kidney stones).  · Place a stent to bypass an obstruction.  · Take special X-rays of the kidneys.  Based on the findings, your doctor may recommend other tests or treatments.  What is a cystoscope?  A cystoscope is a telescope-like instrument that contains lenses and fiberoptics (small glass wires that make bright light). The cystoscope may be straight and rigid, or flexible to bend around curves in the urethra. The doctor may look directly into the cystoscope, or project the image onto a monitor.  Getting ready  · Ask your doctor if you should stop taking any medicines before the procedure.  · Ask whether you should avoid eating or drinking anything after midnight before the procedure.  · Follow any other instructions your doctor gives you.  Tell your doctor before the exam if you:  · Take any medicines, such as aspirin or blood thinners  · Have allergies to any medicines  · Are pregnant   The procedure  Cystoscopy is done in the doctors office, surgery center, or hospital. The doctor and a nurse are present during the procedure. It takes only a few minutes, longer if a biopsy, X-ray, or treatment needs to be done.  During the procedure:  · You lie on an exam table on your back, knees bent and legs apart. You are covered with a drape.  · Your urethra and the area around it are washed. Anesthetic jelly may be applied to numb the urethra. Other pain medicine is usually not needed. In some cases, you may be offered a mild sedative to help you relax. If a more extensive procedure is to be done, such as a biopsy or kidney stone removal, general anesthesia may be needed.  · The cystoscope is inserted. A sterile fluid is put  into the bladder to expand it. You may feel pressure from this fluid.  · When the procedure is done, the cystoscope is removed.  After the procedure  If you had a sedative, general anesthesia, or spinal anesthesia, you must have someone drive you home. Once youre home:  · Drink plenty of fluids.  · You may have burning or light bleeding when you urinate--this is normal.  · Medicines may be prescribed to ease any discomfort or prevent infection. Take these as directed.  · Call your doctor if you have heavy bleeding or blood clots, burning that lasts more than a day, a fever over 100°F  (38° C), or trouble urinating.  Date Last Reviewed: 1/1/2017 © 2000-2017 [employer]. [address] [place] [address], [place] [address]. All rights reserved. This information is not intended as a substitute for professional medical care. Always follow your healthcare professional's instructions.        Anesthesia: Before You Receive Anesthesia  You are scheduled for surgery. Youll receive medicine called anesthesia to keep you from feeling pain during the surgery. This sheet explains steps you may need to take to prepare for anesthesia.    Tests  Your healthcare provider may send you to have certain tests before your procedure. These may include:  · Blood tests. These help show how anesthesia may affect you.  · Electrocardiography (ECG or EKG). This helps show how your heart is working.  · Chest X-ray. This image helps show the health of your heart and lungs.  Medicines  In the weeks before your surgery:  · Tell your healthcare provider and anesthesia provider what medicines you take. This includes aspirin, other over-the-counter medicines, herbs, and vitamins. Be sure to mention if you take illegal drugs. (This will be kept confidential.) Giving this information helps to keep you safe.  · You may be told to change certain medicines you take. Or you may be told to stop taking medicines for a certain amount of time.  · Mention  how much alcohol you drink and if you smoke. Also mention whether youre allergic to any medicines.  Other preparations  · Follow any directions you are given for not eating or drinking before surgery.  · If you dont talk to your anesthesia provider before surgery, you will meet the day of the procedure. He or she will explain your anesthesia and answer your questions.  · Arrange for an adult family member or friend to drive you home after the surgery.  Be sure to follow all your healthcare providers instructions. If you dont, your procedure may have to be rescheduled.   Date Last Reviewed: 12/1/2016  © 6040-3284 [employer]. [address] [place] [address], [place] [address]. All rights reserved. This information is not intended as a substitute for professional medical care. Always follow your healthcare professional's instructions.

## 2020-05-28 NOTE — ANESTHESIA POSTPROCEDURE EVALUATION
Anesthesia Post Evaluation    Patient: Claudia Lerner    Procedure(s) Performed: Procedure(s) (LRB):  CYSTOSCOPY, WITH RETROGRADE PYELOGRAM (Right)  CYSTOSCOPY, WITH URETERAL STENT REMOVAL (Right)  CYSTOSCOPY, WITH URETERAL STENT INSERTION (Right)    Final Anesthesia Type: general    Patient location during evaluation: PACU  Patient participation: Yes- Able to Participate  Level of consciousness: awake and alert  Post-procedure vital signs: reviewed and stable  Pain management: adequate  Airway patency: patent    PONV status at discharge: No PONV  Anesthetic complications: no      Cardiovascular status: stable  Respiratory status: unassisted and spontaneous ventilation  Hydration status: euvolemic  Follow-up not needed.          Vitals Value Taken Time   /74 5/28/2020  2:46 PM   Temp 36.4 °C (97.6 °F) 5/28/2020  2:45 PM   Pulse 57 5/28/2020  2:48 PM   Resp 18 5/28/2020  2:48 PM   SpO2 96 % 5/28/2020  2:48 PM   Vitals shown include unvalidated device data.      No case tracking events are documented in the log.      Pain/Julio César Score: Pain Rating Prior to Med Admin: 0 (5/28/2020  1:10 PM)  Julio César Score: 10 (5/28/2020  2:45 PM)

## 2020-06-18 DIAGNOSIS — N13.30 HYDRONEPHROSIS: Primary | ICD-10-CM

## 2020-07-13 ENCOUNTER — HOSPITAL ENCOUNTER (OUTPATIENT)
Dept: RADIOLOGY | Facility: HOSPITAL | Age: 80
Discharge: HOME | End: 2020-07-13
Attending: SPECIALIST
Payer: MEDICARE

## 2020-07-13 DIAGNOSIS — N13.5 STRICTURE OR KINKING OF URETER: ICD-10-CM

## 2020-07-13 DIAGNOSIS — N13.30 HYDRONEPHROSIS: ICD-10-CM

## 2020-07-13 DIAGNOSIS — N13.30 HYDRONEPHROSIS: Primary | ICD-10-CM

## 2020-07-13 PROCEDURE — 74018 RADEX ABDOMEN 1 VIEW: CPT | Mod: TC,PO

## 2020-10-26 ENCOUNTER — HOSPITAL ENCOUNTER (OUTPATIENT)
Dept: RADIOLOGY | Facility: HOSPITAL | Age: 80
Discharge: HOME | End: 2020-10-26
Attending: SPECIALIST
Payer: MEDICARE

## 2020-10-26 ENCOUNTER — HOSPITAL ENCOUNTER (OUTPATIENT)
Dept: PREADMISSION TESTING | Facility: HOSPITAL | Age: 80
Discharge: HOME | End: 2020-10-26
Attending: SPECIALIST
Payer: MEDICARE

## 2020-10-26 VITALS
HEIGHT: 60 IN | DIASTOLIC BLOOD PRESSURE: 78 MMHG | BODY MASS INDEX: 21.48 KG/M2 | OXYGEN SATURATION: 96 % | SYSTOLIC BLOOD PRESSURE: 165 MMHG | RESPIRATION RATE: 20 BRPM | TEMPERATURE: 97 F | HEART RATE: 60 BPM

## 2020-10-26 DIAGNOSIS — Z01.818 PRE-OP TESTING: ICD-10-CM

## 2020-10-26 DIAGNOSIS — Z41.9 SURGERY, ELECTIVE: Primary | ICD-10-CM

## 2020-10-26 DIAGNOSIS — N13.5 STRICTURE OR KINKING OF URETER: ICD-10-CM

## 2020-10-26 DIAGNOSIS — N13.5 STRICTURE OR KINKING OF URETER: Primary | ICD-10-CM

## 2020-10-26 PROCEDURE — U0003 INFECTIOUS AGENT DETECTION BY NUCLEIC ACID (DNA OR RNA); SEVERE ACUTE RESPIRATORY SYNDROME CORONAVIRUS 2 (SARS-COV-2) (CORONAVIRUS DISEASE [COVID-19]), AMPLIFIED PROBE TECHNIQUE, MAKING USE OF HIGH THROUGHPUT TECHNOLOGIES AS DESCRIBED BY CMS-2020-01-R: HCPCS

## 2020-10-26 RX ORDER — CEFAZOLIN SODIUM 1 G/3ML
2 INJECTION, POWDER, FOR SOLUTION INTRAMUSCULAR; INTRAVENOUS ONCE
Status: CANCELLED | OUTPATIENT
Start: 2020-10-29

## 2020-10-26 NOTE — DISCHARGE INSTRUCTIONS
Pre-Op will call the afternoon prior to surgery between 4:00 and 6:00 PM with the final arrival time.    1 Person can come with you the day of surgery.  Enter at [employer] [address] and come through front entrance.  You will be screened/ have temperature checked.    You may have 1 visitor who will also be screened.     Check in at registration.   After registration, proceed past gift shop and through glass door ( [facility]) Check in at the nurses station to the left.   Do not eat or drink anything after midnight the night before your surgery - THIS INCLUDES  WATER, GUM, MINTS AND CANDY.  YOU MAY BRUSH YOUR TEETH BUT DO NOT SWALLOW     PLEASE NOTE:  The surgery schedule has many variables which may affect the time of your surgery case.  Family members should be available if your surgery time changes.  Plan to be here the day of your procedure between 4-6 hours.      DO NOT TAKE THESE MEDICATIONS 5-7 DAYS PRIOR to your procedure or per your surgeon's request: ASPIRIN, ALEVE, ADVIL, IBUPROFEN,  JANEL SELTZER, BC , FISH OIL , VITAMIN E, HERBALS  (May take Tylenol)      ONLY if you are prescribed any types of blood thinners such as:  Aspirin, Coumadin, Plavix, Pradaxa, Xarelto, Aggrenox, Effient, Eliquis, Savasya, Brilinta, or any other, ask your surgeon whether you should stop taking them and how long before surgery you should stop.  You may also need to verify with the prescribing physician if it is ok to stop your medication.                                                        IMPORTANT INSTRUCTIONS      · Do not smoke, vape or drink alcoholic beverages 24 hours prior to your procedure.  · Shower the night before AND the morning of your procedure with a Chlorhexidine wash such as Hibiclens or Dial antibacterial soap from the neck down.   ·  Do not get it on your face or in your eyes.  You may use your own shampoo and face wash. This helps your skin to be as bacteria free as possible.   ·  DO NOT remove hair  from the surgery site.  Do not shave the incision site unless you are given specific instructions to do so.    · Sleep in a bed with clean sheets.  Do not sleep with a pet in the bed.   · If you wear contact lenses, dentures, hearing aids or glasses, bring a container to put them in during surgery and give to a family member for safe keeping.    · Please leave all jewelry, piercing's and valuables at home.   · Wear rubber soled shoes (no flip flops).    · If your doctor has scheduled you for an overnight stay, bring a small overnight bag with any personal items you need.    · Make arrangements in advance for transportation home by a responsible adult.      · You must make arrangements for transportation, TAXI'S, UBER'S OR LYFTS ARE NOT ALLOWED.        If you have any questions about these instructions, call (Monday - Friday) Pre-Op Admit  Nursing  at [phone] or the Pre-Op Day Surgery Unit at [phone].

## 2020-10-27 LAB — SARS-COV-2 RNA RESP QL NAA+PROBE: NOT DETECTED

## 2020-11-02 ENCOUNTER — HOSPITAL ENCOUNTER (OUTPATIENT)
Dept: PREADMISSION TESTING | Facility: HOSPITAL | Age: 80
Discharge: HOME | End: 2020-11-02
Attending: SPECIALIST
Payer: MEDICARE

## 2020-11-02 PROCEDURE — U0003 INFECTIOUS AGENT DETECTION BY NUCLEIC ACID (DNA OR RNA); SEVERE ACUTE RESPIRATORY SYNDROME CORONAVIRUS 2 (SARS-COV-2) (CORONAVIRUS DISEASE [COVID-19]), AMPLIFIED PROBE TECHNIQUE, MAKING USE OF HIGH THROUGHPUT TECHNOLOGIES AS DESCRIBED BY CMS-2020-01-R: HCPCS

## 2020-11-03 LAB — SARS-COV-2 RNA RESP QL NAA+PROBE: NOT DETECTED

## 2020-11-04 NOTE — PRE-PROCEDURE INSTRUCTIONS
Pt rescheduled to 11/5/20. Called and spoke with pt's daughter. They understand preop instructions and have no questions. Also pt is continuing to hold aspirin and plavix.

## 2020-11-05 ENCOUNTER — HOSPITAL ENCOUNTER (OUTPATIENT)
Facility: HOSPITAL | Age: 80
Discharge: HOME | End: 2020-11-05
Attending: SPECIALIST | Admitting: SPECIALIST
Payer: MEDICARE

## 2020-11-05 ENCOUNTER — ANESTHESIA EVENT (OUTPATIENT)
Dept: SURGERY | Facility: HOSPITAL | Age: 80
End: 2020-11-05
Payer: MEDICARE

## 2020-11-05 ENCOUNTER — ANESTHESIA (OUTPATIENT)
Dept: SURGERY | Facility: HOSPITAL | Age: 80
End: 2020-11-05
Payer: MEDICARE

## 2020-11-05 ENCOUNTER — HOSPITAL ENCOUNTER (OUTPATIENT)
Dept: RADIOLOGY | Facility: HOSPITAL | Age: 80
Discharge: HOME | End: 2020-11-05
Attending: SPECIALIST | Admitting: SPECIALIST
Payer: MEDICARE

## 2020-11-05 VITALS
SYSTOLIC BLOOD PRESSURE: 166 MMHG | OXYGEN SATURATION: 95 % | BODY MASS INDEX: 26.31 KG/M2 | TEMPERATURE: 98 F | WEIGHT: 134 LBS | HEIGHT: 60 IN | HEART RATE: 64 BPM | DIASTOLIC BLOOD PRESSURE: 78 MMHG | RESPIRATION RATE: 16 BRPM

## 2020-11-05 DIAGNOSIS — Z01.818 PRE-OP TESTING: ICD-10-CM

## 2020-11-05 DIAGNOSIS — N13.30 HYDRONEPHROSIS, UNSPECIFIED HYDRONEPHROSIS TYPE: Primary | ICD-10-CM

## 2020-11-05 DIAGNOSIS — R10.9 PAIN IN THE ABDOMEN: ICD-10-CM

## 2020-11-05 PROCEDURE — 37000008 HC ANESTHESIA 1ST 15 MINUTES: Performed by: SPECIALIST

## 2020-11-05 PROCEDURE — 71000015 HC POSTOP RECOV 1ST HR: Performed by: SPECIALIST

## 2020-11-05 PROCEDURE — 63600175 PHARM REV CODE 636 W HCPCS: Performed by: NURSE ANESTHETIST, CERTIFIED REGISTERED

## 2020-11-05 PROCEDURE — C1769 GUIDE WIRE: HCPCS | Performed by: SPECIALIST

## 2020-11-05 PROCEDURE — 71000016 HC POSTOP RECOV ADDL HR: Performed by: SPECIALIST

## 2020-11-05 PROCEDURE — 63600175 PHARM REV CODE 636 W HCPCS: Performed by: ANESTHESIOLOGY

## 2020-11-05 PROCEDURE — 71000039 HC RECOVERY, EACH ADD'L HOUR: Performed by: SPECIALIST

## 2020-11-05 PROCEDURE — 36000707: Performed by: SPECIALIST

## 2020-11-05 PROCEDURE — 71000033 HC RECOVERY, INTIAL HOUR: Performed by: SPECIALIST

## 2020-11-05 PROCEDURE — 36000706: Performed by: SPECIALIST

## 2020-11-05 PROCEDURE — 76000 FLUOROSCOPY <1 HR PHYS/QHP: CPT | Mod: TC

## 2020-11-05 PROCEDURE — 25000003 PHARM REV CODE 250: Performed by: NURSE ANESTHETIST, CERTIFIED REGISTERED

## 2020-11-05 PROCEDURE — 37000009 HC ANESTHESIA EA ADD 15 MINS: Performed by: SPECIALIST

## 2020-11-05 PROCEDURE — C2617 STENT, NON-COR, TEM W/O DEL: HCPCS | Performed by: SPECIALIST

## 2020-11-05 PROCEDURE — 27201423 OPTIME MED/SURG SUP & DEVICES STERILE SUPPLY: Performed by: SPECIALIST

## 2020-11-05 PROCEDURE — 25000003 PHARM REV CODE 250: Performed by: SPECIALIST

## 2020-11-05 DEVICE — STENT URETERAL FIRM 4.8FX26 ASCERTA: Type: IMPLANTABLE DEVICE | Site: URETER | Status: FUNCTIONAL

## 2020-11-05 RX ORDER — PROPOFOL 10 MG/ML
VIAL (ML) INTRAVENOUS
Status: DISCONTINUED | OUTPATIENT
Start: 2020-11-05 | End: 2020-11-05

## 2020-11-05 RX ORDER — TRAMADOL HYDROCHLORIDE 50 MG/1
50 TABLET ORAL DAILY
Qty: 5 TABLET | Refills: 0
Start: 2020-11-05 | End: 2021-02-22

## 2020-11-05 RX ORDER — OXYCODONE HYDROCHLORIDE 5 MG/1
5 TABLET ORAL
Status: DISCONTINUED | OUTPATIENT
Start: 2020-11-05 | End: 2020-11-05 | Stop reason: HOSPADM

## 2020-11-05 RX ORDER — HYDROMORPHONE HYDROCHLORIDE 1 MG/ML
0.2 INJECTION, SOLUTION INTRAMUSCULAR; INTRAVENOUS; SUBCUTANEOUS
Status: DISCONTINUED | OUTPATIENT
Start: 2020-11-05 | End: 2020-11-05 | Stop reason: HOSPADM

## 2020-11-05 RX ORDER — FENTANYL CITRATE 50 UG/ML
INJECTION, SOLUTION INTRAMUSCULAR; INTRAVENOUS
Status: DISCONTINUED | OUTPATIENT
Start: 2020-11-05 | End: 2020-11-05

## 2020-11-05 RX ORDER — FAMOTIDINE 10 MG/ML
INJECTION INTRAVENOUS
Status: DISCONTINUED | OUTPATIENT
Start: 2020-11-05 | End: 2020-11-05

## 2020-11-05 RX ORDER — ONDANSETRON 2 MG/ML
4 INJECTION INTRAMUSCULAR; INTRAVENOUS DAILY PRN
Status: DISCONTINUED | OUTPATIENT
Start: 2020-11-05 | End: 2020-11-05 | Stop reason: HOSPADM

## 2020-11-05 RX ORDER — ACETAMINOPHEN 10 MG/ML
INJECTION, SOLUTION INTRAVENOUS
Status: DISCONTINUED | OUTPATIENT
Start: 2020-11-05 | End: 2020-11-05

## 2020-11-05 RX ORDER — ONDANSETRON 2 MG/ML
INJECTION INTRAMUSCULAR; INTRAVENOUS
Status: DISCONTINUED | OUTPATIENT
Start: 2020-11-05 | End: 2020-11-05

## 2020-11-05 RX ORDER — CEPHALEXIN 500 MG/1
500 CAPSULE ORAL
Qty: 15 CAPSULE | Refills: 0
Start: 2020-11-05 | End: 2021-02-22 | Stop reason: ALTCHOICE

## 2020-11-05 RX ORDER — CEFAZOLIN SODIUM 1 G/3ML
INJECTION, POWDER, FOR SOLUTION INTRAMUSCULAR; INTRAVENOUS
Status: DISCONTINUED | OUTPATIENT
Start: 2020-11-05 | End: 2020-11-05

## 2020-11-05 RX ORDER — SODIUM CHLORIDE 0.9 % (FLUSH) 0.9 %
10 SYRINGE (ML) INJECTION
Status: DISCONTINUED | OUTPATIENT
Start: 2020-11-05 | End: 2020-11-05 | Stop reason: HOSPADM

## 2020-11-05 RX ORDER — LIDOCAINE HYDROCHLORIDE 20 MG/ML
JELLY TOPICAL
Status: DISCONTINUED | OUTPATIENT
Start: 2020-11-05 | End: 2020-11-05 | Stop reason: HOSPADM

## 2020-11-05 RX ORDER — SODIUM CHLORIDE, SODIUM LACTATE, POTASSIUM CHLORIDE, CALCIUM CHLORIDE 600; 310; 30; 20 MG/100ML; MG/100ML; MG/100ML; MG/100ML
INJECTION, SOLUTION INTRAVENOUS CONTINUOUS PRN
Status: DISCONTINUED | OUTPATIENT
Start: 2020-11-05 | End: 2020-11-05

## 2020-11-05 RX ADMIN — PROPOFOL 45 MG: 10 INJECTION, EMULSION INTRAVENOUS at 02:11

## 2020-11-05 RX ADMIN — SODIUM CHLORIDE, SODIUM LACTATE, POTASSIUM CHLORIDE, AND CALCIUM CHLORIDE: .6; .31; .03; .02 INJECTION, SOLUTION INTRAVENOUS at 01:11

## 2020-11-05 RX ADMIN — ONDANSETRON 4 MG: 2 INJECTION INTRAMUSCULAR; INTRAVENOUS at 02:11

## 2020-11-05 RX ADMIN — ONDANSETRON 4 MG: 2 INJECTION, SOLUTION INTRAMUSCULAR; INTRAVENOUS at 03:11

## 2020-11-05 RX ADMIN — CEFAZOLIN 2 G: 330 INJECTION, POWDER, FOR SOLUTION INTRAMUSCULAR; INTRAVENOUS at 02:11

## 2020-11-05 RX ADMIN — FENTANYL CITRATE 50 MCG: 50 INJECTION INTRAMUSCULAR; INTRAVENOUS at 02:11

## 2020-11-05 RX ADMIN — FAMOTIDINE 20 MG: 10 INJECTION, SOLUTION INTRAVENOUS at 02:11

## 2020-11-05 RX ADMIN — ACETAMINOPHEN 1000 MG: 10 INJECTION, SOLUTION INTRAVENOUS at 02:11

## 2020-11-05 NOTE — OP NOTE
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  Placed in the cystoscopy position.  Patient is prepped and draped.  Anesthesia is given.  We enter the urinary bladder with the 21 fr cystoscope.  We identified no lesions in the bladder  We are able to remove the stent  A wire was floated into the kidney  We then change the stent over the wire  The stent good position removed the telescope  Taken to recovery in good condition    Findings/Key Components:          Estimated Blood Loss:      None

## 2020-11-05 NOTE — TRANSFER OF CARE
Anesthesia Transfer of Care Note    Patient: Claudia Lerner    Procedure(s) Performed: Procedure(s) (LRB):  CYSTOSCOPY, WITH URETERAL STENT CHANGE (Right)  CYSTOSCOPY, WITH URETERAL STENT INSERTION (Right)    Patient location: PACU    Anesthesia Type: general    Transport from OR: Transported from OR on room air with adequate spontaneous ventilation    Post pain: adequate analgesia    Post assessment: no apparent anesthetic complications    Post vital signs: stable    Level of consciousness: awake and alert    Nausea/Vomiting: no nausea/vomiting    Complications: none    Transfer of care protocol was followed      Last vitals:   Visit Vitals  BP (!) 168/68   Pulse 61   Temp 36.6 °C (97.8 °F) (Oral)   Resp 16   Ht 5' (1.524 m)   Wt 60.8 kg (134 lb)   LMP  (LMP Unknown)   SpO2 (!) 60%   Breastfeeding No   BMI 26.17 kg/m²

## 2020-11-05 NOTE — DISCHARGE INSTRUCTIONS
FOLLOW THE DOCTORS INSTRUCTIONS I REVIEWED WITH YOU AND A COPY GIVEN TO YOU.   FOLLOW UP WITH DR IN 2 MONTHS OR SOONER IF PROBLEMS ARISE.

## 2020-11-05 NOTE — ANESTHESIA POSTPROCEDURE EVALUATION
Anesthesia Post Evaluation    Patient: Claudia Lerner    Procedure(s) Performed: Procedure(s) (LRB):  CYSTOSCOPY, WITH URETERAL STENT REMOVAL (Right)  CYSTOSCOPY, WITH URETERAL STENT INSERTION (Right)    Final Anesthesia Type: general    Patient location during evaluation: PACU  Patient participation: Yes- Able to Participate  Level of consciousness: awake and alert  Post-procedure vital signs: reviewed and stable  Pain management: adequate  Airway patency: patent    PONV status at discharge: No PONV  Anesthetic complications: no      Cardiovascular status: blood pressure returned to baseline and stable  Respiratory status: unassisted and room air  Hydration status: euvolemic  Follow-up not needed.          Vitals Value Taken Time   /69 11/05/20 1500   Temp 36.9 °C (98.4 °F) 11/05/20 1430   Pulse 60 11/05/20 1459   Resp 15 11/05/20 1459   SpO2 100 % 11/05/20 1459   Vitals shown include unvalidated device data.      No case tracking events are documented in the log.      Pain/Julio César Score: Julio César Score: 10 (11/5/2020  2:30 PM)

## 2020-11-05 NOTE — ANESTHESIA PREPROCEDURE EVALUATION
11/05/2020  Claudia Lerner is a 80 y.o., female.    Patient Active Problem List   Diagnosis    Malignant neoplasm of upper-outer quadrant of left female breast    Hydronephrosis    Acute hypoxemic respiratory failure    Hydropneumothorax    Renal insufficiency    Bladder prolapse, female, acquired    Essential hypertension    Macrocytic anemia    Tobacco use disorder    Acute respiratory failure with hypoxemia    Pneumothorax, left    Chronic atrial fibrillation    Hypokalemia    Paroxysmal atrial fibrillation    Weakness    Intractable pain    Sepsis due to urinary tract infection    Complicated UTI (urinary tract infection)    Acquired hydroureter    Acute blood loss anemia    PVD (peripheral vascular disease)    Hydronephrosis of right kidney       Past Surgical History:   Procedure Laterality Date    aortagram with runoff  03/16/2018    APPENDECTOMY  1965    CHOLECYSTECTOMY  1980    CYSTOSCOPY W/ RETROGRADES Right 5/28/2020    Procedure: CYSTOSCOPY, WITH RETROGRADE PYELOGRAM;  Surgeon: Mario Hernandez MD;  Location: [facility];  Service: Urology;  Laterality: Right;    CYSTOSCOPY W/ URETERAL STENT PLACEMENT Right 7/27/2018    Procedure: CYSTOSCOPY, WITH URETERAL STENT INSERTION;  Surgeon: Tremaine Victor MD;  Location: [facility];  Service: Urology;  Laterality: Right;  RAFAEL VIDEO CONFIRMED 7/10/DME    CYSTOSCOPY W/ URETERAL STENT PLACEMENT Right 11/16/2018    Procedure: CYSTOSCOPY, WITH URETERAL STENT INSERTION;  Surgeon: Tremaine Victor MD;  Location: [facility] OR;  Service: Urology;  Laterality: Right;  STYKER CONFIRMED 11/7/DME    CYSTOSCOPY W/ URETERAL STENT PLACEMENT Right 5/24/2019    Procedure: CYSTOSCOPY, WITH URETERAL STENT INSERTION;  Surgeon: Tremaine Victor MD;  Location: [facility] OR;  Service: Urology;  Laterality: Right;  rafael video confirmed 5/17/dme     CYSTOSCOPY W/ URETERAL STENT PLACEMENT Right 9/6/2019    Procedure: CYSTOSCOPY, WITH URETERAL STENT INSERTION;  Surgeon: Tremaine Victor MD;  Location: [facility] OR;  Service: Urology;  Laterality: Right;    CYSTOSCOPY W/ URETERAL STENT PLACEMENT Right 12/6/2019    Procedure: CYSTOSCOPY, WITH URETERAL STENT INSERTION;  Surgeon: Tremaine Victor MD;  Location: [facility] OR;  Service: Urology;  Laterality: Right;  NORI VIDEO CONFIRMED DME    CYSTOSCOPY W/ URETERAL STENT PLACEMENT Right 5/28/2020    Procedure: CYSTOSCOPY, WITH URETERAL STENT INSERTION;  Surgeon: Mario Hernandez MD;  Location: [facility];  Service: Urology;  Laterality: Right;    CYSTOSCOPY W/ URETERAL STENT REMOVAL Right 11/16/2018    Procedure: CYSTOSCOPY, WITH URETERAL STENT REMOVAL;  Surgeon: Tremaine Victor MD;  Location: [facility];  Service: Urology;  Laterality: Right;    CYSTOSCOPY W/ URETERAL STENT REMOVAL Right 5/24/2019    Procedure: CYSTOSCOPY, WITH URETERAL STENT REMOVAL;  Surgeon: Tremaine Victor MD;  Location: [facility] OR;  Service: Urology;  Laterality: Right;    CYSTOSCOPY W/ URETERAL STENT REMOVAL Right 9/6/2019    Procedure: CYSTOSCOPY, WITH URETERAL STENT REMOVAL;  Surgeon: Tremaine Victor MD;  Location: [facility] OR;  Service: Urology;  Laterality: Right;    CYSTOSCOPY W/ URETERAL STENT REMOVAL Right 12/6/2019    Procedure: CYSTOSCOPY, WITH URETERAL STENT REMOVAL;  Surgeon: Tremaine Victor MD;  Location: [facility];  Service: Urology;  Laterality: Right;    CYSTOSCOPY W/ URETERAL STENT REMOVAL Right 5/28/2020    Procedure: CYSTOSCOPY, WITH URETERAL STENT REMOVAL;  Surgeon: Mario Hernandez MD;  Location: [facility];  Service: Urology;  Laterality: Right;    cystoscopy with stent      HYSTERECTOMY  1965        Tobacco Use:  The patient  reports that she quit smoking about 3 years ago. Her smoking use included cigarettes. She has a 50.00 pack-year smoking history. She has never used smokeless tobacco.     Results for orders placed or  performed during the hospital encounter of 05/26/20   EKG 12-lead    Collection Time: 05/26/20  1:21 PM    Narrative    Test Reason : Z01.811,    Vent. Rate : 051 BPM     Atrial Rate : 051 BPM     P-R Int : 150 ms          QRS Dur : 074 ms      QT Int : 470 ms       P-R-T Axes : 046 -47 065 degrees     QTc Int : 433 ms    Sinus bradycardia  Left axis deviation  Nonspecific T wave abnormality  Abnormal ECG  No previous ECGs available  Confirmed by Mekhi Bro MD ([id number]) on 5/27/2020 9:17:20 PM    Referred By: GRAYSON ROSAS           Confirmed By:Mekhi Bro MD             Lab Results   Component Value Date    WBC 5.60 10/26/2020    HGB 10.6 (L) 10/26/2020    HCT 33.4 (L) 10/26/2020     (H) 10/26/2020     10/26/2020     BMP  Lab Results   Component Value Date     10/26/2020    K 4.2 10/26/2020     (H) 10/26/2020    CO2 21 (L) 10/26/2020    BUN 23 10/26/2020    CREATININE 1.4 10/26/2020    CALCIUM 9.4 10/26/2020    ANIONGAP 10 10/26/2020    ESTGFRAFRICA 40.4 (A) 10/26/2020    EGFRNONAA 35.0 (A) 10/26/2020             Pre-op Assessment    I have reviewed the Patient Summary Reports.     I have reviewed the Nursing Notes. I have reviewed the NPO Status.   I have reviewed the Medications.     Review of Systems  Anesthesia Hx:  Hx of Anesthetic complications (h/o disorientation once)  Denies Family Hx of Anesthesia complications.  Personal Hx of Anesthesia complications, Post-Operative Nausea/Vomiting, in the past, but not with recent anesthetics / prophylaxis   Social:  Former Smoker    Hematology/Oncology:  Hematology Normal   Oncology Normal     EENT/Dental:   Glaucoma and mac degen     Cardiovascular:   Hypertension Dysrhythmias atrial fibrillation PVD hyperlipidemia  Functional Capacity unable to determine   Denies Congenital Heart Disease.    Denies Congenital Heart Disease.   Denies Deep Venous Thrombosis (DVT)  Hypertension    Pulmonary:   COPD, moderate  Chronic Obstructive  Pulmonary Disease (COPD):    Renal/:   Chronic Renal Disease, CRI renal calculi  Kidney Function/Disease, Chronic Kidney Disease (CKD)    Hepatic/GI:  Denies Liver Disease    Musculoskeletal:  Musculoskeletal Normal  Denies Rheumatic Disease    Neurological:   CVA, no residual symptoms  Denies Dx of Headaches Denies Seizure Disorder  CVA - Cerebrovasular Accident    Endocrine:  Endocrine Normal  Denies Diabetes    Psych:  Psychiatric Normal           Physical Exam  General:  Well nourished    Airway/Jaw/Neck:  Airway Findings: Mouth Opening: Normal General Airway Assessment: Adult  Mallampati: I  TM Distance: Normal, at least 6 cm       Chest/Lungs:  Chest/Lungs Findings: Clear to auscultation     Heart/Vascular:  Heart Findings: Rhythm: Occasional Prematures        Mental Status:  Mental Status Findings:  Cooperative, Alert and Oriented         Anesthesia Plan  Type of Anesthesia, risks & benefits discussed:  Anesthesia Type:  general  Patient's Preference:   Intra-op Monitoring Plan: standard ASA monitors  Intra-op Monitoring Plan Comments:   Post Op Pain Control Plan: multimodal analgesia  Post Op Pain Control Plan Comments:   Induction:   IV  Beta Blocker:  Patient is not currently on a Beta-Blocker (No further documentation required).       Informed Consent: Patient understands risks and agrees with Anesthesia plan.  Questions answered. Anesthesia consent signed with patient.  ASA Score: 4     Day of Surgery Review of History & Physical: I have interviewed and examined the patient. I have reviewed the patient's H&P dated:    H&P update referred to the surgeon.     Anesthesia Plan Notes: LMA.  Ofirmev 1000 mg.  No Versed and minimal fentanyl  Zofran, Pepcid, Decadron 4mg        Ready For Surgery From Anesthesia Perspective.

## 2020-11-05 NOTE — ANESTHESIA PROCEDURE NOTES
Intubation  Performed by: Adam Blount CRNA  Authorized by: Markus Babcock MD     Intubation:     Induction:  Intravenous    Intubated:  Postinduction    Mask Ventilation:  Easy mask    Attempts:  1    Attempted By:  CRNA    Difficult Airway Encountered?: No      Complications:  None    Airway Device:  Supraglottic airway/LMA    Airway Device Size:  4.0    Style/Cuff Inflation:  Uncuffed    Secured at:  The lips    Placement Verified By:  Capnometry    Complicating Factors:  None    Findings Post-Intubation:  BS equal bilateral and atraumatic/condition of teeth unchanged  Notes:      I-gel LMA

## 2020-11-12 NOTE — H&P
[facility]  History & Physical    SUBJECTIVE:     Chief Complaint/Reason for Admission:     History of Present Illness:  Patient is a 80 y.o. female presents with ureteral stricture  Here for cystoscopic stent change    No medications prior to admission.       Review of patient's allergies indicates:   Allergen Reactions    Sulfa (sulfonamide antibiotics) Other (See Comments)     unknown    Codeine Palpitations       Past Medical History:   Diagnosis Date    A-fib 2017    history    AAA (abdominal aortic aneurysm) 2017    5.2 cm    Acquired hydroureter 2018    Adenocarcinoma 2017    left breast cancer , metastatic no chemo or surgery    Anemia     Aneurysm of thoracic aorta 03/16/2018    Carotid stenosis 2018    Cataract     COPD (chronic obstructive pulmonary disease) 2017    emphysema    Diarrhea     Fatigue     Glaucoma 2015    History of prolapse of bladder 2017    Hydronephrosis 2017    Hydropneumothorax 2017    LEFT    Hyperlipidemia 2019    Hypertension 2017    ovarian mass was removed    Hypokalemia     Hypotension     Kidney mass 2017    Macular degeneration 2011    PAD (peripheral artery disease)     PVD (peripheral vascular disease) 2018    Renal insufficiency 2017    Sepsis due to urinary tract infection 2017    Stroke 2017    no resisdual    Ureteral obstruction, right 2017    Urinary incontinence 2005    UTI (urinary tract infection)      Past Surgical History:   Procedure Laterality Date    aortagram with runoff  03/16/2018    APPENDECTOMY  1965    CHOLECYSTECTOMY  1980    CYSTOSCOPY W/ RETROGRADES Right 5/28/2020    Procedure: CYSTOSCOPY, WITH RETROGRADE PYELOGRAM;  Surgeon: Mario Hernandez MD;  Location: [facility] OR;  Service: Urology;  Laterality: Right;    CYSTOSCOPY W/ URETERAL STENT PLACEMENT Right 7/27/2018    Procedure: CYSTOSCOPY, WITH URETERAL STENT INSERTION;  Surgeon: Tremaine Victor MD;  Location: [facility] OR;  Service: Urology;  Laterality:  Right;  NORI VIDEO CONFIRMED 7/10/DME    CYSTOSCOPY W/ URETERAL STENT PLACEMENT Right 11/16/2018    Procedure: CYSTOSCOPY, WITH URETERAL STENT INSERTION;  Surgeon: Tremaine Victor MD;  Location: [facility];  Service: Urology;  Laterality: Right;  STYKER CONFIRMED 11/7/DME    CYSTOSCOPY W/ URETERAL STENT PLACEMENT Right 5/24/2019    Procedure: CYSTOSCOPY, WITH URETERAL STENT INSERTION;  Surgeon: Tremaine Victor MD;  Location: [facility];  Service: Urology;  Laterality: Right;  nori video confirmed 5/17/dme    CYSTOSCOPY W/ URETERAL STENT PLACEMENT Right 9/6/2019    Procedure: CYSTOSCOPY, WITH URETERAL STENT INSERTION;  Surgeon: Tremaine Victor MD;  Location: [facility];  Service: Urology;  Laterality: Right;    CYSTOSCOPY W/ URETERAL STENT PLACEMENT Right 12/6/2019    Procedure: CYSTOSCOPY, WITH URETERAL STENT INSERTION;  Surgeon: Tremaine Victor MD;  Location: [facility];  Service: Urology;  Laterality: Right;  NORI VIDEO CONFIRMED DME    CYSTOSCOPY W/ URETERAL STENT PLACEMENT Right 5/28/2020    Procedure: CYSTOSCOPY, WITH URETERAL STENT INSERTION;  Surgeon: Mario Hernandez MD;  Location: [facility];  Service: Urology;  Laterality: Right;    CYSTOSCOPY W/ URETERAL STENT PLACEMENT Right 11/5/2020    Procedure: CYSTOSCOPY, WITH URETERAL STENT INSERTION;  Surgeon: Mario Hernandez MD;  Location: [facility];  Service: Urology;  Laterality: Right;    CYSTOSCOPY W/ URETERAL STENT REMOVAL Right 11/16/2018    Procedure: CYSTOSCOPY, WITH URETERAL STENT REMOVAL;  Surgeon: Tremaine Victor MD;  Location: [facility];  Service: Urology;  Laterality: Right;    CYSTOSCOPY W/ URETERAL STENT REMOVAL Right 5/24/2019    Procedure: CYSTOSCOPY, WITH URETERAL STENT REMOVAL;  Surgeon: Tremaine Victor MD;  Location: [facility];  Service: Urology;  Laterality: Right;    CYSTOSCOPY W/ URETERAL STENT REMOVAL Right 9/6/2019    Procedure: CYSTOSCOPY, WITH URETERAL STENT REMOVAL;  Surgeon: Tremaine Victor MD;  Location: [facility];  Service:  Urology;  Laterality: Right;    CYSTOSCOPY W/ URETERAL STENT REMOVAL Right 12/6/2019    Procedure: CYSTOSCOPY, WITH URETERAL STENT REMOVAL;  Surgeon: Tremaine Victor MD;  Location: [facility] OR;  Service: Urology;  Laterality: Right;    CYSTOSCOPY W/ URETERAL STENT REMOVAL Right 5/28/2020    Procedure: CYSTOSCOPY, WITH URETERAL STENT REMOVAL;  Surgeon: Mario Hernandez MD;  Location: [facility] OR;  Service: Urology;  Laterality: Right;    CYSTOSCOPY W/ URETERAL STENT REMOVAL Right 11/5/2020    Procedure: CYSTOSCOPY, WITH URETERAL STENT REMOVAL;  Surgeon: Mario Hernandez MD;  Location: [facility] OR;  Service: Urology;  Laterality: Right;    cystoscopy with stent      HYSTERECTOMY  1965     Family History   Problem Relation Age of Onset    Heart disease Mother     Heart disease Father     Cancer Father     Diabetes Father     Heart disease Sister     Aneurysm Son      Social History     Tobacco Use    Smoking status: Former Smoker     Packs/day: 1.00     Years: 50.00     Pack years: 50.00     Types: Cigarettes     Quit date: 2017     Years since quitting: 3.8    Smokeless tobacco: Never Used    Tobacco comment: denied TTS consult   Substance Use Topics    Alcohol use: No    Drug use: No        Review of Systems:  Negative      OBJECTIVE:     Vital Signs (Most Recent):  Temp: 97.9 °F (36.6 °C) (11/05/20 1645)  Pulse: 64 (11/05/20 1645)  Resp: 16 (11/05/20 1645)  BP: (!) 166/78 (11/05/20 1645)  SpO2: 95 % (11/05/20 1645)    Inpatient Medications:  No current facility-administered medications for this encounter.      Facility-Administered Medications Ordered in Other Encounters   Medication Dose Route Frequency Provider Last Rate Last Dose    lactated ringers infusion   Intravenous Continuous Tremaine Victor MD 75 mL/hr at 05/24/19 0730      lactated ringers infusion   Intravenous Continuous Tremaine Victor MD              ASSESSMENT/PLAN:         History ureteral stricture    Here for cystoscopy with stent  change

## 2021-01-09 ENCOUNTER — IMMUNIZATION (OUTPATIENT)
Dept: PRIMARY CARE CLINIC | Facility: CLINIC | Age: 81
End: 2021-01-09
Payer: MEDICARE

## 2021-01-09 DIAGNOSIS — Z23 NEED FOR VACCINATION: ICD-10-CM

## 2021-01-09 PROCEDURE — 91300 COVID-19, MRNA, LNP-S, PF, 30 MCG/0.3 ML DOSE VACCINE: CPT | Mod: PBBFAC,PN

## 2021-01-30 ENCOUNTER — IMMUNIZATION (OUTPATIENT)
Dept: PRIMARY CARE CLINIC | Facility: CLINIC | Age: 81
End: 2021-01-30
Payer: MEDICARE

## 2021-01-30 DIAGNOSIS — Z23 NEED FOR VACCINATION: Primary | ICD-10-CM

## 2021-01-30 PROCEDURE — 0002A COVID-19, MRNA, LNP-S, PF, 30 MCG/0.3 ML DOSE VACCINE: ICD-10-PCS | Mod: CV19,,, | Performed by: EMERGENCY MEDICINE

## 2021-01-30 PROCEDURE — 91300 COVID-19, MRNA, LNP-S, PF, 30 MCG/0.3 ML DOSE VACCINE: CPT | Mod: ,,, | Performed by: EMERGENCY MEDICINE

## 2021-01-30 PROCEDURE — 91300 COVID-19, MRNA, LNP-S, PF, 30 MCG/0.3 ML DOSE VACCINE: ICD-10-PCS | Mod: ,,, | Performed by: EMERGENCY MEDICINE

## 2021-01-30 PROCEDURE — 0002A COVID-19, MRNA, LNP-S, PF, 30 MCG/0.3 ML DOSE VACCINE: CPT | Mod: CV19,,, | Performed by: EMERGENCY MEDICINE

## 2021-02-22 ENCOUNTER — HOSPITAL ENCOUNTER (OUTPATIENT)
Dept: PREADMISSION TESTING | Facility: HOSPITAL | Age: 81
Discharge: HOME | End: 2021-02-22
Attending: SPECIALIST
Payer: MEDICARE

## 2021-02-22 ENCOUNTER — HOSPITAL ENCOUNTER (OUTPATIENT)
Dept: RADIOLOGY | Facility: HOSPITAL | Age: 81
Discharge: HOME | End: 2021-02-22
Attending: SPECIALIST
Payer: MEDICARE

## 2021-02-22 VITALS
BODY MASS INDEX: 25.02 KG/M2 | SYSTOLIC BLOOD PRESSURE: 154 MMHG | WEIGHT: 127.44 LBS | HEIGHT: 60 IN | DIASTOLIC BLOOD PRESSURE: 84 MMHG | HEART RATE: 68 BPM | RESPIRATION RATE: 18 BRPM | OXYGEN SATURATION: 95 %

## 2021-02-22 DIAGNOSIS — N13.30 HYDRONEPHROSIS: ICD-10-CM

## 2021-02-22 DIAGNOSIS — N13.5 STRICTURE OR KINKING OF URETER: Primary | ICD-10-CM

## 2021-02-22 DIAGNOSIS — N13.5 STRICTURE OR KINKING OF URETER: ICD-10-CM

## 2021-02-22 DIAGNOSIS — Z01.818 PREOP TESTING: ICD-10-CM

## 2021-02-22 DIAGNOSIS — Z01.818 PREOP EXAMINATION: Primary | ICD-10-CM

## 2021-02-22 PROCEDURE — U0003 INFECTIOUS AGENT DETECTION BY NUCLEIC ACID (DNA OR RNA); SEVERE ACUTE RESPIRATORY SYNDROME CORONAVIRUS 2 (SARS-COV-2) (CORONAVIRUS DISEASE [COVID-19]), AMPLIFIED PROBE TECHNIQUE, MAKING USE OF HIGH THROUGHPUT TECHNOLOGIES AS DESCRIBED BY CMS-2020-01-R: HCPCS

## 2021-02-22 PROCEDURE — U0005 INFEC AGEN DETEC AMPLI PROBE: HCPCS

## 2021-02-22 PROCEDURE — 93010 ELECTROCARDIOGRAM REPORT: CPT | Mod: ,,, | Performed by: INTERNAL MEDICINE

## 2021-02-22 PROCEDURE — 93005 ELECTROCARDIOGRAM TRACING: CPT | Performed by: INTERNAL MEDICINE

## 2021-02-22 PROCEDURE — 71046 X-RAY EXAM CHEST 2 VIEWS: CPT | Mod: TC

## 2021-02-22 PROCEDURE — 93010 EKG 12-LEAD: ICD-10-PCS | Mod: ,,, | Performed by: INTERNAL MEDICINE

## 2021-02-22 RX ORDER — CEFAZOLIN SODIUM 2 G/50ML
2 SOLUTION INTRAVENOUS ONCE
Status: CANCELLED | OUTPATIENT
Start: 2021-02-25

## 2021-02-23 LAB — SARS-COV-2 RNA RESP QL NAA+PROBE: NOT DETECTED

## 2021-02-25 ENCOUNTER — ANESTHESIA (OUTPATIENT)
Dept: SURGERY | Facility: HOSPITAL | Age: 81
End: 2021-02-25
Payer: MEDICARE

## 2021-02-25 ENCOUNTER — HOSPITAL ENCOUNTER (OUTPATIENT)
Facility: HOSPITAL | Age: 81
Discharge: HOME | End: 2021-02-25
Attending: SPECIALIST | Admitting: SPECIALIST
Payer: MEDICARE

## 2021-02-25 ENCOUNTER — ANESTHESIA EVENT (OUTPATIENT)
Dept: SURGERY | Facility: HOSPITAL | Age: 81
End: 2021-02-25
Payer: MEDICARE

## 2021-02-25 VITALS
OXYGEN SATURATION: 97 % | SYSTOLIC BLOOD PRESSURE: 129 MMHG | HEART RATE: 79 BPM | TEMPERATURE: 97 F | RESPIRATION RATE: 17 BRPM | DIASTOLIC BLOOD PRESSURE: 59 MMHG

## 2021-02-25 DIAGNOSIS — N13.30 HYDRONEPHROSIS OF RIGHT KIDNEY: Primary | ICD-10-CM

## 2021-02-25 DIAGNOSIS — Z01.818 PREOP TESTING: ICD-10-CM

## 2021-02-25 PROCEDURE — 71000033 HC RECOVERY, INTIAL HOUR: Performed by: SPECIALIST

## 2021-02-25 PROCEDURE — C2617 STENT, NON-COR, TEM W/O DEL: HCPCS | Performed by: SPECIALIST

## 2021-02-25 PROCEDURE — 63600175 PHARM REV CODE 636 W HCPCS: Performed by: SPECIALIST

## 2021-02-25 PROCEDURE — 37000009 HC ANESTHESIA EA ADD 15 MINS: Performed by: SPECIALIST

## 2021-02-25 PROCEDURE — 25000003 PHARM REV CODE 250: Performed by: SPECIALIST

## 2021-02-25 PROCEDURE — C1769 GUIDE WIRE: HCPCS | Performed by: SPECIALIST

## 2021-02-25 PROCEDURE — 63600175 PHARM REV CODE 636 W HCPCS: Performed by: NURSE ANESTHETIST, CERTIFIED REGISTERED

## 2021-02-25 PROCEDURE — 71000015 HC POSTOP RECOV 1ST HR: Performed by: SPECIALIST

## 2021-02-25 PROCEDURE — 71000039 HC RECOVERY, EACH ADD'L HOUR: Performed by: SPECIALIST

## 2021-02-25 PROCEDURE — 36000706: Performed by: SPECIALIST

## 2021-02-25 PROCEDURE — 27201423 OPTIME MED/SURG SUP & DEVICES STERILE SUPPLY: Performed by: SPECIALIST

## 2021-02-25 PROCEDURE — 37000008 HC ANESTHESIA 1ST 15 MINUTES: Performed by: SPECIALIST

## 2021-02-25 PROCEDURE — 25000003 PHARM REV CODE 250: Performed by: NURSE ANESTHETIST, CERTIFIED REGISTERED

## 2021-02-25 PROCEDURE — 63600175 PHARM REV CODE 636 W HCPCS: Performed by: ANESTHESIOLOGY

## 2021-02-25 PROCEDURE — 36000707: Performed by: SPECIALIST

## 2021-02-25 DEVICE — STENT URETERAL FIRM 4.8FX26 ASCERTA: Type: IMPLANTABLE DEVICE | Site: URETER | Status: FUNCTIONAL

## 2021-02-25 RX ORDER — CEFAZOLIN SODIUM 2 G/50ML
2 SOLUTION INTRAVENOUS ONCE
Status: COMPLETED | OUTPATIENT
Start: 2021-02-25 | End: 2021-02-25

## 2021-02-25 RX ORDER — LIDOCAINE HYDROCHLORIDE 20 MG/ML
JELLY TOPICAL
Status: DISCONTINUED | OUTPATIENT
Start: 2021-02-25 | End: 2021-02-25 | Stop reason: HOSPADM

## 2021-02-25 RX ORDER — MEPERIDINE HYDROCHLORIDE 50 MG/ML
12.5 INJECTION INTRAMUSCULAR; INTRAVENOUS; SUBCUTANEOUS EVERY 10 MIN PRN
Status: CANCELLED | OUTPATIENT
Start: 2021-02-25 | End: 2021-02-25

## 2021-02-25 RX ORDER — FAMOTIDINE 10 MG/ML
INJECTION INTRAVENOUS
Status: DISCONTINUED | OUTPATIENT
Start: 2021-02-25 | End: 2021-02-25

## 2021-02-25 RX ORDER — OXYCODONE HYDROCHLORIDE 5 MG/1
5 TABLET ORAL
Status: CANCELLED | OUTPATIENT
Start: 2021-02-25

## 2021-02-25 RX ORDER — FENTANYL CITRATE 50 UG/ML
INJECTION, SOLUTION INTRAMUSCULAR; INTRAVENOUS
Status: DISCONTINUED | OUTPATIENT
Start: 2021-02-25 | End: 2021-02-25

## 2021-02-25 RX ORDER — PROPOFOL 10 MG/ML
VIAL (ML) INTRAVENOUS
Status: DISCONTINUED | OUTPATIENT
Start: 2021-02-25 | End: 2021-02-25

## 2021-02-25 RX ORDER — ONDANSETRON 2 MG/ML
4 INJECTION INTRAMUSCULAR; INTRAVENOUS DAILY PRN
Status: DISCONTINUED | OUTPATIENT
Start: 2021-02-25 | End: 2021-02-25 | Stop reason: HOSPADM

## 2021-02-25 RX ORDER — ONDANSETRON 2 MG/ML
4 INJECTION INTRAMUSCULAR; INTRAVENOUS DAILY PRN
Status: CANCELLED | OUTPATIENT
Start: 2021-02-25

## 2021-02-25 RX ORDER — HYDROCODONE BITARTRATE AND ACETAMINOPHEN 5; 325 MG/1; MG/1
1 TABLET ORAL EVERY 6 HOURS PRN
Qty: 1 TABLET | Refills: 0
Start: 2021-02-25 | End: 2021-10-04 | Stop reason: ALTCHOICE

## 2021-02-25 RX ORDER — DIPHENHYDRAMINE HYDROCHLORIDE 50 MG/ML
12.5 INJECTION INTRAMUSCULAR; INTRAVENOUS
Status: CANCELLED | OUTPATIENT
Start: 2021-02-25

## 2021-02-25 RX ORDER — SODIUM CHLORIDE, SODIUM LACTATE, POTASSIUM CHLORIDE, CALCIUM CHLORIDE 600; 310; 30; 20 MG/100ML; MG/100ML; MG/100ML; MG/100ML
INJECTION, SOLUTION INTRAVENOUS CONTINUOUS PRN
Status: DISCONTINUED | OUTPATIENT
Start: 2021-02-25 | End: 2021-02-25

## 2021-02-25 RX ORDER — LIDOCAINE HYDROCHLORIDE 20 MG/ML
INJECTION, SOLUTION EPIDURAL; INFILTRATION; INTRACAUDAL; PERINEURAL
Status: DISCONTINUED | OUTPATIENT
Start: 2021-02-25 | End: 2021-02-25

## 2021-02-25 RX ORDER — SODIUM CHLORIDE 0.9 % (FLUSH) 0.9 %
10 SYRINGE (ML) INJECTION
Status: DISCONTINUED | OUTPATIENT
Start: 2021-02-25 | End: 2021-02-25 | Stop reason: HOSPADM

## 2021-02-25 RX ORDER — MEPERIDINE HYDROCHLORIDE 50 MG/ML
12.5 INJECTION INTRAMUSCULAR; INTRAVENOUS; SUBCUTANEOUS EVERY 10 MIN PRN
Status: DISCONTINUED | OUTPATIENT
Start: 2021-02-25 | End: 2021-02-25 | Stop reason: HOSPADM

## 2021-02-25 RX ORDER — SODIUM CHLORIDE 0.9 % (FLUSH) 0.9 %
10 SYRINGE (ML) INJECTION
Status: CANCELLED | OUTPATIENT
Start: 2021-02-25

## 2021-02-25 RX ORDER — HYDROMORPHONE HYDROCHLORIDE 1 MG/ML
0.2 INJECTION, SOLUTION INTRAMUSCULAR; INTRAVENOUS; SUBCUTANEOUS
Status: CANCELLED | OUTPATIENT
Start: 2021-02-25

## 2021-02-25 RX ORDER — HYDRALAZINE HYDROCHLORIDE 20 MG/ML
5 INJECTION INTRAMUSCULAR; INTRAVENOUS ONCE
Status: COMPLETED | OUTPATIENT
Start: 2021-02-25 | End: 2021-02-25

## 2021-02-25 RX ORDER — ONDANSETRON 2 MG/ML
INJECTION INTRAMUSCULAR; INTRAVENOUS
Status: DISCONTINUED | OUTPATIENT
Start: 2021-02-25 | End: 2021-02-25

## 2021-02-25 RX ORDER — ACETAMINOPHEN 10 MG/ML
INJECTION, SOLUTION INTRAVENOUS
Status: DISCONTINUED | OUTPATIENT
Start: 2021-02-25 | End: 2021-02-25

## 2021-02-25 RX ORDER — FENTANYL CITRATE 50 UG/ML
25 INJECTION, SOLUTION INTRAMUSCULAR; INTRAVENOUS EVERY 5 MIN PRN
Status: DISCONTINUED | OUTPATIENT
Start: 2021-02-25 | End: 2021-02-25 | Stop reason: HOSPADM

## 2021-02-25 RX ADMIN — LIDOCAINE HYDROCHLORIDE 50 MG: 20 INJECTION, SOLUTION INTRAVENOUS at 08:02

## 2021-02-25 RX ADMIN — SODIUM CHLORIDE, SODIUM LACTATE, POTASSIUM CHLORIDE, AND CALCIUM CHLORIDE: .6; .31; .03; .02 INJECTION, SOLUTION INTRAVENOUS at 07:02

## 2021-02-25 RX ADMIN — ONDANSETRON 4 MG: 2 INJECTION INTRAMUSCULAR; INTRAVENOUS at 08:02

## 2021-02-25 RX ADMIN — FENTANYL CITRATE 50 MCG: 50 INJECTION INTRAMUSCULAR; INTRAVENOUS at 07:02

## 2021-02-25 RX ADMIN — FAMOTIDINE 20 MG: 10 INJECTION, SOLUTION INTRAVENOUS at 08:02

## 2021-02-25 RX ADMIN — ACETAMINOPHEN 1000 MG: 10 INJECTION, SOLUTION INTRAVENOUS at 08:02

## 2021-02-25 RX ADMIN — CEFAZOLIN SODIUM 2 G: 2 SOLUTION INTRAVENOUS at 07:02

## 2021-02-25 RX ADMIN — HYDRALAZINE HYDROCHLORIDE 5 MG: 20 INJECTION, SOLUTION INTRAMUSCULAR; INTRAVENOUS at 09:02

## 2021-02-25 RX ADMIN — ONDANSETRON 4 MG: 2 INJECTION INTRAMUSCULAR; INTRAVENOUS at 09:02

## 2021-02-25 RX ADMIN — PROPOFOL 90 MG: 10 INJECTION, EMULSION INTRAVENOUS at 08:02

## 2021-05-12 NOTE — DISCHARGE SUMMARY
Patient comes in for stent change    Procedure is done w no complication    After a brief stay in recovery, patient is discharged in good condition    Meds given:  Keflex Ultram    F/u office:  8 weeks   Please call Mario at [phone]. He had a resection in 2004 and he wondered if a colonoscopy is something he should do or not

## 2021-05-22 NOTE — LETTER
April 18, 2018      Danish Ross MD  [address]  [address]nd [address]  [place]  [place] [address]           [place] [facility]  [address], [address]  [place] [address]  Phone: [phone]          Patient: Claudia Lernre   MR Number: [id number]   YOB: 1940   Date of Visit: 4/18/2018       Dear Dr. Danish Ross:    Thank you for referring Claudia Lerner to me for evaluation. Attached you will find relevant portions of my assessment and plan of care.    If you have questions, please do not hesitate to call me. I look forward to following Claudia Lerner along with you.    Sincerely,    Skinny Rincon MD    Enclosure  CC:  No Recipients    If you would like to receive this communication electronically, please contact [email or web address] or [phone] to request more information on [employer] access.    For providers and/or their staff who would like to refer a patient to [facility], please contact us through our one-stop-shop provider referral line, [facility], at [phone].    If you feel you have received this communication in error or would no longer like to receive these types of communications, please e-mail [email or web address]         
show

## 2021-06-07 ENCOUNTER — HOSPITAL ENCOUNTER (OUTPATIENT)
Dept: PREADMISSION TESTING | Facility: HOSPITAL | Age: 81
Discharge: HOME | End: 2021-06-07
Attending: SPECIALIST
Payer: MEDICARE

## 2021-06-07 ENCOUNTER — HOSPITAL ENCOUNTER (OUTPATIENT)
Dept: RADIOLOGY | Facility: HOSPITAL | Age: 81
Discharge: HOME | End: 2021-06-07
Attending: SPECIALIST
Payer: MEDICARE

## 2021-06-07 VITALS
DIASTOLIC BLOOD PRESSURE: 70 MMHG | BODY MASS INDEX: 24.68 KG/M2 | HEIGHT: 60 IN | HEART RATE: 84 BPM | RESPIRATION RATE: 14 BRPM | OXYGEN SATURATION: 98 % | TEMPERATURE: 98 F | SYSTOLIC BLOOD PRESSURE: 130 MMHG | WEIGHT: 125.69 LBS

## 2021-06-07 DIAGNOSIS — N13.5 STRICTURE OR KINKING OF URETER: Primary | ICD-10-CM

## 2021-06-07 DIAGNOSIS — Z01.818 PRE-OP TESTING: Primary | ICD-10-CM

## 2021-06-07 DIAGNOSIS — N13.5 STRICTURE OR KINKING OF URETER: ICD-10-CM

## 2021-06-07 RX ORDER — CEFAZOLIN SODIUM 2 G/50ML
2 SOLUTION INTRAVENOUS ONCE
Status: CANCELLED | OUTPATIENT
Start: 2021-06-10

## 2021-06-10 ENCOUNTER — ANESTHESIA EVENT (OUTPATIENT)
Dept: SURGERY | Facility: HOSPITAL | Age: 81
End: 2021-06-10
Payer: MEDICARE

## 2021-06-10 ENCOUNTER — ANESTHESIA (OUTPATIENT)
Dept: SURGERY | Facility: HOSPITAL | Age: 81
End: 2021-06-10
Payer: MEDICARE

## 2021-06-10 ENCOUNTER — HOSPITAL ENCOUNTER (OUTPATIENT)
Facility: HOSPITAL | Age: 81
Discharge: HOME | End: 2021-06-10
Attending: SPECIALIST | Admitting: SPECIALIST
Payer: MEDICARE

## 2021-06-10 VITALS
SYSTOLIC BLOOD PRESSURE: 151 MMHG | HEART RATE: 74 BPM | OXYGEN SATURATION: 99 % | RESPIRATION RATE: 16 BRPM | WEIGHT: 125 LBS | TEMPERATURE: 98 F | BODY MASS INDEX: 24.54 KG/M2 | DIASTOLIC BLOOD PRESSURE: 80 MMHG | HEIGHT: 60 IN

## 2021-06-10 DIAGNOSIS — N13.1 HYDRONEPHROSIS WITH URETERAL STRICTURE, NOT ELSEWHERE CLASSIFIED: Primary | ICD-10-CM

## 2021-06-10 DIAGNOSIS — Z01.818 PRE-OP TESTING: ICD-10-CM

## 2021-06-10 DIAGNOSIS — R10.9 ABDOMINAL PAIN: ICD-10-CM

## 2021-06-10 PROCEDURE — 63600175 PHARM REV CODE 636 W HCPCS

## 2021-06-10 PROCEDURE — 63600175 PHARM REV CODE 636 W HCPCS: Performed by: NURSE ANESTHETIST, CERTIFIED REGISTERED

## 2021-06-10 PROCEDURE — C1769 GUIDE WIRE: HCPCS | Performed by: SPECIALIST

## 2021-06-10 PROCEDURE — 27000673 HC TUBING BLOOD Y: Performed by: ANESTHESIOLOGY

## 2021-06-10 PROCEDURE — 36000707: Performed by: SPECIALIST

## 2021-06-10 PROCEDURE — 71000033 HC RECOVERY, INTIAL HOUR: Performed by: SPECIALIST

## 2021-06-10 PROCEDURE — 63600175 PHARM REV CODE 636 W HCPCS: Performed by: ANESTHESIOLOGY

## 2021-06-10 PROCEDURE — 27200651 HC AIRWAY, LMA: Performed by: ANESTHESIOLOGY

## 2021-06-10 PROCEDURE — 25000003 PHARM REV CODE 250: Performed by: NURSE ANESTHETIST, CERTIFIED REGISTERED

## 2021-06-10 PROCEDURE — 37000008 HC ANESTHESIA 1ST 15 MINUTES: Performed by: SPECIALIST

## 2021-06-10 PROCEDURE — 36000706: Performed by: SPECIALIST

## 2021-06-10 PROCEDURE — 27000671 HC TUBING MICROBORE EXT: Performed by: ANESTHESIOLOGY

## 2021-06-10 PROCEDURE — 25000003 PHARM REV CODE 250

## 2021-06-10 PROCEDURE — 27201423 OPTIME MED/SURG SUP & DEVICES STERILE SUPPLY: Performed by: SPECIALIST

## 2021-06-10 PROCEDURE — 71000039 HC RECOVERY, EACH ADD'L HOUR: Performed by: SPECIALIST

## 2021-06-10 PROCEDURE — 71000015 HC POSTOP RECOV 1ST HR: Performed by: SPECIALIST

## 2021-06-10 PROCEDURE — 27202107 HC XP QUATRO SENSOR: Performed by: ANESTHESIOLOGY

## 2021-06-10 PROCEDURE — 25000003 PHARM REV CODE 250: Performed by: SPECIALIST

## 2021-06-10 PROCEDURE — 37000009 HC ANESTHESIA EA ADD 15 MINS: Performed by: SPECIALIST

## 2021-06-10 RX ORDER — FAMOTIDINE 10 MG/ML
INJECTION INTRAVENOUS
Status: DISCONTINUED | OUTPATIENT
Start: 2021-06-10 | End: 2021-06-10

## 2021-06-10 RX ORDER — LIDOCAINE HYDROCHLORIDE 20 MG/ML
INJECTION, SOLUTION EPIDURAL; INFILTRATION; INTRACAUDAL; PERINEURAL
Status: DISCONTINUED | OUTPATIENT
Start: 2021-06-10 | End: 2021-06-10

## 2021-06-10 RX ORDER — METOPROLOL TARTRATE 1 MG/ML
2.5 INJECTION, SOLUTION INTRAVENOUS ONCE
Status: COMPLETED | OUTPATIENT
Start: 2021-06-10 | End: 2021-06-10

## 2021-06-10 RX ORDER — ACETAMINOPHEN 10 MG/ML
INJECTION, SOLUTION INTRAVENOUS
Status: DISCONTINUED | OUTPATIENT
Start: 2021-06-10 | End: 2021-06-10

## 2021-06-10 RX ORDER — DIPHENHYDRAMINE HYDROCHLORIDE 50 MG/ML
12.5 INJECTION INTRAMUSCULAR; INTRAVENOUS
Status: DISCONTINUED | OUTPATIENT
Start: 2021-06-10 | End: 2021-06-10 | Stop reason: HOSPADM

## 2021-06-10 RX ORDER — LIDOCAINE HYDROCHLORIDE 20 MG/ML
JELLY TOPICAL
Status: DISCONTINUED | OUTPATIENT
Start: 2021-06-10 | End: 2021-06-10

## 2021-06-10 RX ORDER — PROPOFOL 10 MG/ML
VIAL (ML) INTRAVENOUS
Status: DISCONTINUED | OUTPATIENT
Start: 2021-06-10 | End: 2021-06-10

## 2021-06-10 RX ORDER — LIDOCAINE HYDROCHLORIDE 20 MG/ML
JELLY TOPICAL
Status: DISCONTINUED | OUTPATIENT
Start: 2021-06-10 | End: 2021-06-10 | Stop reason: HOSPADM

## 2021-06-10 RX ORDER — HYDRALAZINE HYDROCHLORIDE 20 MG/ML
5 INJECTION INTRAMUSCULAR; INTRAVENOUS ONCE
Status: COMPLETED | OUTPATIENT
Start: 2021-06-10 | End: 2021-06-10

## 2021-06-10 RX ORDER — DEXAMETHASONE SODIUM PHOSPHATE 4 MG/ML
INJECTION, SOLUTION INTRA-ARTICULAR; INTRALESIONAL; INTRAMUSCULAR; INTRAVENOUS; SOFT TISSUE
Status: DISCONTINUED | OUTPATIENT
Start: 2021-06-10 | End: 2021-06-10

## 2021-06-10 RX ORDER — ONDANSETRON 2 MG/ML
INJECTION INTRAMUSCULAR; INTRAVENOUS
Status: DISCONTINUED | OUTPATIENT
Start: 2021-06-10 | End: 2021-06-10

## 2021-06-10 RX ORDER — MEPERIDINE HYDROCHLORIDE 50 MG/ML
12.5 INJECTION INTRAMUSCULAR; INTRAVENOUS; SUBCUTANEOUS EVERY 10 MIN PRN
Status: DISCONTINUED | OUTPATIENT
Start: 2021-06-10 | End: 2021-06-10 | Stop reason: HOSPADM

## 2021-06-10 RX ORDER — FENTANYL CITRATE 50 UG/ML
25 INJECTION, SOLUTION INTRAMUSCULAR; INTRAVENOUS
Status: DISCONTINUED | OUTPATIENT
Start: 2021-06-10 | End: 2021-06-10 | Stop reason: HOSPADM

## 2021-06-10 RX ORDER — SODIUM CHLORIDE 0.9 % (FLUSH) 0.9 %
10 SYRINGE (ML) INJECTION
Status: DISCONTINUED | OUTPATIENT
Start: 2021-06-10 | End: 2021-06-10 | Stop reason: HOSPADM

## 2021-06-10 RX ORDER — METOPROLOL TARTRATE 1 MG/ML
INJECTION, SOLUTION INTRAVENOUS
Status: COMPLETED
Start: 2021-06-10 | End: 2021-06-10

## 2021-06-10 RX ORDER — SODIUM CHLORIDE, SODIUM LACTATE, POTASSIUM CHLORIDE, CALCIUM CHLORIDE 600; 310; 30; 20 MG/100ML; MG/100ML; MG/100ML; MG/100ML
INJECTION, SOLUTION INTRAVENOUS CONTINUOUS PRN
Status: DISCONTINUED | OUTPATIENT
Start: 2021-06-10 | End: 2021-06-10

## 2021-06-10 RX ORDER — OXYCODONE HYDROCHLORIDE 5 MG/1
5 TABLET ORAL
Status: DISCONTINUED | OUTPATIENT
Start: 2021-06-10 | End: 2021-06-10 | Stop reason: HOSPADM

## 2021-06-10 RX ORDER — TRAMADOL HYDROCHLORIDE AND ACETAMINOPHEN 37.5; 325 MG/1; MG/1
1 TABLET, FILM COATED ORAL EVERY 4 HOURS PRN
Qty: 12 TABLET | Refills: 0 | Status: ON HOLD
Start: 2021-06-10 | End: 2021-10-07

## 2021-06-10 RX ORDER — ONDANSETRON 2 MG/ML
4 INJECTION INTRAMUSCULAR; INTRAVENOUS DAILY PRN
Status: DISCONTINUED | OUTPATIENT
Start: 2021-06-10 | End: 2021-06-10 | Stop reason: HOSPADM

## 2021-06-10 RX ORDER — CEFAZOLIN SODIUM 2 G/50ML
2 SOLUTION INTRAVENOUS ONCE
Status: COMPLETED | OUTPATIENT
Start: 2021-06-10 | End: 2021-06-10

## 2021-06-10 RX ORDER — FENTANYL CITRATE 50 UG/ML
INJECTION, SOLUTION INTRAMUSCULAR; INTRAVENOUS
Status: DISCONTINUED | OUTPATIENT
Start: 2021-06-10 | End: 2021-06-10

## 2021-06-10 RX ADMIN — METOPROLOL TARTRATE 2.5 MG: 5 INJECTION, SOLUTION INTRAVENOUS at 11:06

## 2021-06-10 RX ADMIN — ONDANSETRON 4 MG: 2 INJECTION INTRAMUSCULAR; INTRAVENOUS at 12:06

## 2021-06-10 RX ADMIN — METOPROLOL TARTRATE 2.5 MG: 1 INJECTION, SOLUTION INTRAVENOUS at 11:06

## 2021-06-10 RX ADMIN — FENTANYL CITRATE 25 MCG: 50 INJECTION INTRAMUSCULAR; INTRAVENOUS at 10:06

## 2021-06-10 RX ADMIN — LIDOCAINE HYDROCHLORIDE 4 ML: 20 JELLY TOPICAL at 10:06

## 2021-06-10 RX ADMIN — SODIUM CHLORIDE, SODIUM LACTATE, POTASSIUM CHLORIDE, AND CALCIUM CHLORIDE: .6; .31; .03; .02 INJECTION, SOLUTION INTRAVENOUS at 10:06

## 2021-06-10 RX ADMIN — LIDOCAINE HYDROCHLORIDE 60 MG: 20 INJECTION, SOLUTION EPIDURAL; INFILTRATION; INTRACAUDAL; PERINEURAL at 10:06

## 2021-06-10 RX ADMIN — CEFAZOLIN SODIUM 1 G: 2 SOLUTION INTRAVENOUS at 10:06

## 2021-06-10 RX ADMIN — PROPOFOL 70 MG: 10 INJECTION, EMULSION INTRAVENOUS at 10:06

## 2021-06-10 RX ADMIN — HYDRALAZINE HYDROCHLORIDE 5 MG: 20 INJECTION, SOLUTION INTRAMUSCULAR; INTRAVENOUS at 11:06

## 2021-06-10 RX ADMIN — FAMOTIDINE 20 MG: 10 INJECTION, SOLUTION INTRAVENOUS at 10:06

## 2021-06-10 RX ADMIN — DEXAMETHASONE SODIUM PHOSPHATE 4 MG: 4 INJECTION, SOLUTION INTRAMUSCULAR; INTRAVENOUS at 10:06

## 2021-06-10 RX ADMIN — ONDANSETRON 4 MG: 2 INJECTION INTRAMUSCULAR; INTRAVENOUS at 10:06

## 2021-06-10 RX ADMIN — ACETAMINOPHEN 1000 MG: 10 INJECTION, SOLUTION INTRAVENOUS at 10:06

## 2021-10-04 ENCOUNTER — HOSPITAL ENCOUNTER (OUTPATIENT)
Dept: RADIOLOGY | Facility: HOSPITAL | Age: 81
Discharge: HOME | End: 2021-10-04
Attending: SPECIALIST
Payer: MEDICARE

## 2021-10-04 ENCOUNTER — HOSPITAL ENCOUNTER (OUTPATIENT)
Dept: PREADMISSION TESTING | Facility: HOSPITAL | Age: 81
Discharge: HOME | End: 2021-10-04
Attending: SPECIALIST
Payer: MEDICARE

## 2021-10-04 VITALS
HEART RATE: 68 BPM | WEIGHT: 127 LBS | SYSTOLIC BLOOD PRESSURE: 139 MMHG | BODY MASS INDEX: 24.94 KG/M2 | HEIGHT: 60 IN | RESPIRATION RATE: 18 BRPM | DIASTOLIC BLOOD PRESSURE: 70 MMHG | OXYGEN SATURATION: 96 % | TEMPERATURE: 98 F

## 2021-10-04 DIAGNOSIS — Z51.81 MONITORING FOR LONG-TERM ANTICOAGULANT USE: ICD-10-CM

## 2021-10-04 DIAGNOSIS — Z79.01 MONITORING FOR LONG-TERM ANTICOAGULANT USE: ICD-10-CM

## 2021-10-04 DIAGNOSIS — Z01.818 PRE-OP TESTING: Primary | ICD-10-CM

## 2021-10-04 LAB — SARS-COV-2 RDRP RESP QL NAA+PROBE: NEGATIVE

## 2021-10-04 PROCEDURE — 93010 ELECTROCARDIOGRAM REPORT: CPT | Mod: ,,, | Performed by: INTERNAL MEDICINE

## 2021-10-04 PROCEDURE — U0002 COVID-19 LAB TEST NON-CDC: HCPCS | Performed by: SPECIALIST

## 2021-10-04 PROCEDURE — 71046 X-RAY EXAM CHEST 2 VIEWS: CPT | Mod: TC

## 2021-10-04 PROCEDURE — 93005 ELECTROCARDIOGRAM TRACING: CPT | Performed by: INTERNAL MEDICINE

## 2021-10-04 PROCEDURE — 93010 EKG 12-LEAD: ICD-10-PCS | Mod: ,,, | Performed by: INTERNAL MEDICINE

## 2021-10-04 RX ORDER — CEFAZOLIN SODIUM 2 G/50ML
2 SOLUTION INTRAVENOUS ONCE
Status: CANCELLED | OUTPATIENT
Start: 2021-10-07

## 2021-10-07 ENCOUNTER — ANESTHESIA EVENT (OUTPATIENT)
Dept: SURGERY | Facility: HOSPITAL | Age: 81
End: 2021-10-07
Payer: MEDICARE

## 2021-10-07 ENCOUNTER — ANESTHESIA (OUTPATIENT)
Dept: SURGERY | Facility: HOSPITAL | Age: 81
End: 2021-10-07
Payer: MEDICARE

## 2021-10-07 ENCOUNTER — HOSPITAL ENCOUNTER (OUTPATIENT)
Facility: HOSPITAL | Age: 81
Discharge: HOME | End: 2021-10-07
Attending: SPECIALIST | Admitting: SPECIALIST
Payer: MEDICARE

## 2021-10-07 VITALS
TEMPERATURE: 98 F | WEIGHT: 127 LBS | HEART RATE: 75 BPM | RESPIRATION RATE: 16 BRPM | SYSTOLIC BLOOD PRESSURE: 105 MMHG | HEIGHT: 60 IN | DIASTOLIC BLOOD PRESSURE: 42 MMHG | BODY MASS INDEX: 24.94 KG/M2 | OXYGEN SATURATION: 99 %

## 2021-10-07 DIAGNOSIS — Z01.818 PRE-OP TESTING: ICD-10-CM

## 2021-10-07 DIAGNOSIS — N13.30 HYDRONEPHROSIS OF RIGHT KIDNEY: Primary | ICD-10-CM

## 2021-10-07 PROCEDURE — 63600175 PHARM REV CODE 636 W HCPCS: Performed by: NURSE ANESTHETIST, CERTIFIED REGISTERED

## 2021-10-07 PROCEDURE — 36000706: Performed by: SPECIALIST

## 2021-10-07 PROCEDURE — 36000707: Performed by: SPECIALIST

## 2021-10-07 PROCEDURE — 25000003 PHARM REV CODE 250: Performed by: SPECIALIST

## 2021-10-07 PROCEDURE — 37000009 HC ANESTHESIA EA ADD 15 MINS: Performed by: SPECIALIST

## 2021-10-07 PROCEDURE — 27201423 OPTIME MED/SURG SUP & DEVICES STERILE SUPPLY: Performed by: SPECIALIST

## 2021-10-07 PROCEDURE — 71000039 HC RECOVERY, EACH ADD'L HOUR: Performed by: SPECIALIST

## 2021-10-07 PROCEDURE — 71000033 HC RECOVERY, INTIAL HOUR: Performed by: SPECIALIST

## 2021-10-07 PROCEDURE — 25000003 PHARM REV CODE 250: Performed by: NURSE ANESTHETIST, CERTIFIED REGISTERED

## 2021-10-07 PROCEDURE — 63600175 PHARM REV CODE 636 W HCPCS

## 2021-10-07 PROCEDURE — 37000008 HC ANESTHESIA 1ST 15 MINUTES: Performed by: SPECIALIST

## 2021-10-07 PROCEDURE — C2617 STENT, NON-COR, TEM W/O DEL: HCPCS | Performed by: SPECIALIST

## 2021-10-07 PROCEDURE — C1769 GUIDE WIRE: HCPCS | Performed by: SPECIALIST

## 2021-10-07 PROCEDURE — 63600175 PHARM REV CODE 636 W HCPCS: Performed by: ANESTHESIOLOGY

## 2021-10-07 PROCEDURE — 71000015 HC POSTOP RECOV 1ST HR: Performed by: SPECIALIST

## 2021-10-07 DEVICE — STENT URETERAL FIRM 4.8FX26 ASCERTA: Type: IMPLANTABLE DEVICE | Site: URETER | Status: FUNCTIONAL

## 2021-10-07 RX ORDER — LIDOCAINE HYDROCHLORIDE 20 MG/ML
JELLY TOPICAL
Status: DISCONTINUED | OUTPATIENT
Start: 2021-10-07 | End: 2021-10-07 | Stop reason: HOSPADM

## 2021-10-07 RX ORDER — SODIUM CHLORIDE, SODIUM LACTATE, POTASSIUM CHLORIDE, CALCIUM CHLORIDE 600; 310; 30; 20 MG/100ML; MG/100ML; MG/100ML; MG/100ML
INJECTION, SOLUTION INTRAVENOUS CONTINUOUS PRN
Status: DISCONTINUED | OUTPATIENT
Start: 2021-10-07 | End: 2021-10-07

## 2021-10-07 RX ORDER — HYDRALAZINE HYDROCHLORIDE 20 MG/ML
5 INJECTION INTRAMUSCULAR; INTRAVENOUS
Status: COMPLETED | OUTPATIENT
Start: 2021-10-07 | End: 2021-10-07

## 2021-10-07 RX ORDER — ASPIRIN 81 MG/1
81 TABLET ORAL DAILY
COMMUNITY

## 2021-10-07 RX ORDER — LIDOCAINE HYDROCHLORIDE 20 MG/ML
INJECTION, SOLUTION EPIDURAL; INFILTRATION; INTRACAUDAL; PERINEURAL
Status: DISCONTINUED | OUTPATIENT
Start: 2021-10-07 | End: 2021-10-07

## 2021-10-07 RX ORDER — TRAMADOL HYDROCHLORIDE AND ACETAMINOPHEN 37.5; 325 MG/1; MG/1
1 TABLET, FILM COATED ORAL EVERY 8 HOURS PRN
Qty: 12 TABLET | Refills: 0 | Status: ON HOLD
Start: 2021-10-07 | End: 2022-04-28 | Stop reason: CLARIF

## 2021-10-07 RX ORDER — PROPOFOL 10 MG/ML
VIAL (ML) INTRAVENOUS
Status: DISCONTINUED | OUTPATIENT
Start: 2021-10-07 | End: 2021-10-07

## 2021-10-07 RX ORDER — HYDRALAZINE HYDROCHLORIDE 20 MG/ML
INJECTION INTRAMUSCULAR; INTRAVENOUS
Status: COMPLETED
Start: 2021-10-07 | End: 2021-10-07

## 2021-10-07 RX ORDER — CEFAZOLIN SODIUM 2 G/50ML
2 SOLUTION INTRAVENOUS ONCE
Status: COMPLETED | OUTPATIENT
Start: 2021-10-07 | End: 2021-10-07

## 2021-10-07 RX ORDER — SODIUM CHLORIDE 0.9 % (FLUSH) 0.9 %
10 SYRINGE (ML) INJECTION
Status: DISCONTINUED | OUTPATIENT
Start: 2021-10-07 | End: 2021-10-07 | Stop reason: HOSPADM

## 2021-10-07 RX ORDER — ONDANSETRON 2 MG/ML
4 INJECTION INTRAMUSCULAR; INTRAVENOUS DAILY PRN
Status: DISCONTINUED | OUTPATIENT
Start: 2021-10-07 | End: 2021-10-07 | Stop reason: HOSPADM

## 2021-10-07 RX ORDER — FAMOTIDINE 10 MG/ML
INJECTION INTRAVENOUS
Status: DISCONTINUED | OUTPATIENT
Start: 2021-10-07 | End: 2021-10-07

## 2021-10-07 RX ORDER — ONDANSETRON 2 MG/ML
INJECTION INTRAMUSCULAR; INTRAVENOUS
Status: DISCONTINUED | OUTPATIENT
Start: 2021-10-07 | End: 2021-10-07

## 2021-10-07 RX ADMIN — HYDRALAZINE HYDROCHLORIDE 5 MG: 20 INJECTION, SOLUTION INTRAMUSCULAR; INTRAVENOUS at 09:10

## 2021-10-07 RX ADMIN — LIDOCAINE HYDROCHLORIDE 50 MG: 20 INJECTION, SOLUTION INTRAVENOUS at 08:10

## 2021-10-07 RX ADMIN — SODIUM CHLORIDE, SODIUM LACTATE, POTASSIUM CHLORIDE, AND CALCIUM CHLORIDE: .6; .31; .03; .02 INJECTION, SOLUTION INTRAVENOUS at 08:10

## 2021-10-07 RX ADMIN — PROPOFOL 100 MG: 10 INJECTION, EMULSION INTRAVENOUS at 08:10

## 2021-10-07 RX ADMIN — ONDANSETRON 4 MG: 2 INJECTION INTRAMUSCULAR; INTRAVENOUS at 08:10

## 2021-10-07 RX ADMIN — CEFAZOLIN SODIUM 2 G: 2 SOLUTION INTRAVENOUS at 08:10

## 2021-10-07 RX ADMIN — FAMOTIDINE 20 MG: 10 INJECTION, SOLUTION INTRAVENOUS at 08:10

## 2022-01-25 ENCOUNTER — HOSPITAL ENCOUNTER (OUTPATIENT)
Dept: PREADMISSION TESTING | Facility: HOSPITAL | Age: 82
Discharge: HOME | End: 2022-01-25
Attending: SPECIALIST
Payer: MEDICARE

## 2022-01-25 ENCOUNTER — HOSPITAL ENCOUNTER (OUTPATIENT)
Dept: RADIOLOGY | Facility: HOSPITAL | Age: 82
Discharge: HOME | End: 2022-01-25
Attending: SPECIALIST
Payer: MEDICARE

## 2022-01-25 VITALS
DIASTOLIC BLOOD PRESSURE: 81 MMHG | OXYGEN SATURATION: 96 % | RESPIRATION RATE: 18 BRPM | HEIGHT: 61 IN | WEIGHT: 126 LBS | BODY MASS INDEX: 23.79 KG/M2 | SYSTOLIC BLOOD PRESSURE: 138 MMHG | HEART RATE: 77 BPM | TEMPERATURE: 99 F

## 2022-01-25 DIAGNOSIS — Z01.811 PRE-OP CHEST EXAM: Primary | ICD-10-CM

## 2022-01-25 DIAGNOSIS — Z01.818 PRE-OP TESTING: Primary | ICD-10-CM

## 2022-01-25 DIAGNOSIS — Z51.81 MONITORING FOR LONG-TERM ANTICOAGULANT USE: ICD-10-CM

## 2022-01-25 DIAGNOSIS — Z01.811 PRE-OP CHEST EXAM: ICD-10-CM

## 2022-01-25 DIAGNOSIS — Z79.01 MONITORING FOR LONG-TERM ANTICOAGULANT USE: ICD-10-CM

## 2022-01-25 LAB
ALBUMIN SERPL BCP-MCNC: 3.8 G/DL (ref 3.5–5.2)
ALP SERPL-CCNC: 52 U/L (ref 55–135)
ALT SERPL W/O P-5'-P-CCNC: 17 U/L (ref 10–44)
ANION GAP SERPL CALC-SCNC: 10 MMOL/L (ref 8–16)
APTT PPP: 28.4 SEC (ref 23.3–35.1)
AST SERPL-CCNC: 20 U/L (ref 10–40)
BACTERIA #/AREA URNS HPF: ABNORMAL /HPF
BILIRUB SERPL-MCNC: 1 MG/DL (ref 0.1–1)
BILIRUB UR QL STRIP: NEGATIVE
BUN SERPL-MCNC: 35 MG/DL (ref 8–23)
CALCIUM SERPL-MCNC: 9.3 MG/DL (ref 8.7–10.5)
CHLORIDE SERPL-SCNC: 110 MMOL/L (ref 95–110)
CLARITY UR: ABNORMAL
CO2 SERPL-SCNC: 21 MMOL/L (ref 23–29)
COLOR UR: YELLOW
CREAT SERPL-MCNC: 1.3 MG/DL (ref 0.5–1.4)
ERYTHROCYTE [DISTWIDTH] IN BLOOD BY AUTOMATED COUNT: 13.8 % (ref 11.5–14.5)
EST. GFR  (AFRICAN AMERICAN): 43.8 ML/MIN/1.73 M^2
EST. GFR  (NON AFRICAN AMERICAN): 38 ML/MIN/1.73 M^2
GLUCOSE SERPL-MCNC: 94 MG/DL (ref 70–110)
GLUCOSE UR QL STRIP: NEGATIVE
HCT VFR BLD AUTO: 35.6 % (ref 37–48.5)
HGB BLD-MCNC: 11.3 G/DL (ref 12–16)
HGB UR QL STRIP: ABNORMAL
HYALINE CASTS #/AREA URNS LPF: 12 /LPF
INR PPP: 1.1
KETONES UR QL STRIP: NEGATIVE
LEUKOCYTE ESTERASE UR QL STRIP: ABNORMAL
MCH RBC QN AUTO: 31.8 PG (ref 27–31)
MCHC RBC AUTO-ENTMCNC: 31.7 G/DL (ref 32–36)
MCV RBC AUTO: 100 FL (ref 82–98)
MICROSCOPIC COMMENT: ABNORMAL
NITRITE UR QL STRIP: NEGATIVE
PH UR STRIP: 6 [PH] (ref 5–8)
PLATELET # BLD AUTO: 172 K/UL (ref 150–450)
PMV BLD AUTO: 9.5 FL (ref 9.2–12.9)
POTASSIUM SERPL-SCNC: 4.8 MMOL/L (ref 3.5–5.1)
PROT SERPL-MCNC: 7.3 G/DL (ref 6–8.4)
PROT UR QL STRIP: ABNORMAL
PROTHROMBIN TIME: 13.8 SEC (ref 11.4–13.7)
RBC # BLD AUTO: 3.55 M/UL (ref 4–5.4)
RBC #/AREA URNS HPF: 29 /HPF (ref 0–4)
SARS-COV-2 RDRP RESP QL NAA+PROBE: NEGATIVE
SODIUM SERPL-SCNC: 141 MMOL/L (ref 136–145)
SP GR UR STRIP: 1.01 (ref 1–1.03)
SQUAMOUS #/AREA URNS HPF: 2 /HPF
URN SPEC COLLECT METH UR: ABNORMAL
UROBILINOGEN UR STRIP-ACNC: NEGATIVE EU/DL
WBC # BLD AUTO: 6.22 K/UL (ref 3.9–12.7)
WBC #/AREA URNS HPF: >100 /HPF (ref 0–5)

## 2022-01-25 PROCEDURE — 85027 COMPLETE CBC AUTOMATED: CPT | Performed by: SPECIALIST

## 2022-01-25 PROCEDURE — 81001 URINALYSIS AUTO W/SCOPE: CPT | Performed by: SPECIALIST

## 2022-01-25 PROCEDURE — 71046 X-RAY EXAM CHEST 2 VIEWS: CPT | Mod: TC

## 2022-01-25 PROCEDURE — 85610 PROTHROMBIN TIME: CPT | Performed by: SPECIALIST

## 2022-01-25 PROCEDURE — 80053 COMPREHEN METABOLIC PANEL: CPT | Performed by: SPECIALIST

## 2022-01-25 PROCEDURE — 85730 THROMBOPLASTIN TIME PARTIAL: CPT | Performed by: SPECIALIST

## 2022-01-25 PROCEDURE — U0002 COVID-19 LAB TEST NON-CDC: HCPCS | Performed by: SPECIALIST

## 2022-01-25 PROCEDURE — 36415 COLL VENOUS BLD VENIPUNCTURE: CPT | Performed by: SPECIALIST

## 2022-01-25 RX ORDER — CEFAZOLIN SODIUM 2 G/50ML
2 SOLUTION INTRAVENOUS ONCE
Status: CANCELLED | OUTPATIENT
Start: 2022-01-27

## 2022-01-25 NOTE — DISCHARGE INSTRUCTIONS
To confirm, Your doctor has instructed you that surgery is scheduled for:   Thursday, January 27, 2022    Pre-Op will call the afternoon prior to surgery between 4:00 and 6:00 PM with the final arrival time.  Wednesday, January 26, 2022      Please report to [facility] via [facility] entrance. Check in at registration desk.    Do not eat or drink anything after midnight the night before your surgery - THIS INCLUDES  WATER, GUM, MINTS AND CANDY.  YOU MAY BRUSH YOUR TEETH BUT DO NOT SWALLOW     TAKE ONLY THESE MEDICATIONS WITH A SMALL SIP OF WATER THE MORNING OF YOUR PROCEDURE:  MULTAQ    PLEASE NOTE:  The surgery schedule has many variables which may affect the time of your surgery case.  Family members should be available if your surgery time changes.  Plan to be here the day of your procedure between 4-6 hours.      DO NOT TAKE THESE MEDICATIONS 5-7 DAYS PRIOR to your procedure or per your surgeon's request: ASPIRIN, ALEVE, ADVIL, IBUPROFEN,  JANEL SELTZER, BC , FISH OIL , VITAMIN E, HERBALS  (May take Tylenol)      ONLY if you are prescribed any types of blood thinners such as:  Aspirin, Coumadin, Plavix, Pradaxa, Xarelto, Aggrenox, Effient, Eliquis, Savasya, Brilinta, or any other, ask your surgeon whether you should stop taking them and how long before surgery you should stop.  You may also need to verify with the prescribing physician if it is ok to stop your medication.                                                        IMPORTANT INSTRUCTIONS    · Shower the night before AND the morning of your procedure with a Chlorhexidine wash such as Hibiclens or Dial antibacterial soap from the neck down.   ·  Do not get it on your face or in your eyes.  You may use your own shampoo and face wash. This helps your skin to be as bacteria free as possible.     · Sleep in a bed with clean sheets.  Do not sleep with a pet in the bed.   · If you wear contact lenses, dentures, hearing aids or glasses, bring a  container to put them in during surgery and give to a family member for safe keeping.    · Please leave all jewelry, piercing's and valuables at home.     · Make arrangements in advance for transportation home by a responsible adult.    · You must make arrangements for transportation, TAXI'S, UBER'S OR LYFTS ARE NOT ALLOWED.      If you have any questions about these instructions, call Pre-Op Admit  Nursing at [phone] or the Pre-Op Day Surgery Unit at [phone].

## 2022-01-27 ENCOUNTER — ANESTHESIA EVENT (OUTPATIENT)
Dept: SURGERY | Facility: HOSPITAL | Age: 82
End: 2022-01-27
Payer: MEDICARE

## 2022-01-27 ENCOUNTER — ANESTHESIA (OUTPATIENT)
Dept: SURGERY | Facility: HOSPITAL | Age: 82
End: 2022-01-27
Payer: MEDICARE

## 2022-01-27 ENCOUNTER — HOSPITAL ENCOUNTER (OUTPATIENT)
Facility: HOSPITAL | Age: 82
Discharge: HOME | End: 2022-01-27
Attending: SPECIALIST | Admitting: SPECIALIST
Payer: MEDICARE

## 2022-01-27 VITALS
TEMPERATURE: 98 F | HEART RATE: 65 BPM | RESPIRATION RATE: 16 BRPM | BODY MASS INDEX: 23.79 KG/M2 | SYSTOLIC BLOOD PRESSURE: 125 MMHG | OXYGEN SATURATION: 98 % | WEIGHT: 126 LBS | DIASTOLIC BLOOD PRESSURE: 87 MMHG | HEIGHT: 61 IN

## 2022-01-27 DIAGNOSIS — N13.30 HYDRONEPHROSIS OF RIGHT KIDNEY: Primary | ICD-10-CM

## 2022-01-27 DIAGNOSIS — N20.0 KIDNEY STONE: ICD-10-CM

## 2022-01-27 DIAGNOSIS — Z01.818 PRE-OP TESTING: ICD-10-CM

## 2022-01-27 PROCEDURE — 27000654 HC CATH IV JELCO: Performed by: ANESTHESIOLOGY

## 2022-01-27 PROCEDURE — 36000706: Performed by: SPECIALIST

## 2022-01-27 PROCEDURE — 25000003 PHARM REV CODE 250: Performed by: NURSE ANESTHETIST, CERTIFIED REGISTERED

## 2022-01-27 PROCEDURE — 71000033 HC RECOVERY, INTIAL HOUR: Performed by: SPECIALIST

## 2022-01-27 PROCEDURE — 37000009 HC ANESTHESIA EA ADD 15 MINS: Performed by: SPECIALIST

## 2022-01-27 PROCEDURE — 37000008 HC ANESTHESIA 1ST 15 MINUTES: Performed by: SPECIALIST

## 2022-01-27 PROCEDURE — C2617 STENT, NON-COR, TEM W/O DEL: HCPCS | Performed by: SPECIALIST

## 2022-01-27 PROCEDURE — 63600175 PHARM REV CODE 636 W HCPCS: Performed by: NURSE ANESTHETIST, CERTIFIED REGISTERED

## 2022-01-27 PROCEDURE — C1769 GUIDE WIRE: HCPCS | Performed by: SPECIALIST

## 2022-01-27 PROCEDURE — 27201423 OPTIME MED/SURG SUP & DEVICES STERILE SUPPLY: Performed by: SPECIALIST

## 2022-01-27 PROCEDURE — 27000284 HC CANNULA NASAL: Performed by: ANESTHESIOLOGY

## 2022-01-27 PROCEDURE — 27000673 HC TUBING BLOOD Y: Performed by: ANESTHESIOLOGY

## 2022-01-27 PROCEDURE — 25000003 PHARM REV CODE 250: Performed by: SPECIALIST

## 2022-01-27 PROCEDURE — 71000015 HC POSTOP RECOV 1ST HR: Performed by: SPECIALIST

## 2022-01-27 PROCEDURE — 36000707: Performed by: SPECIALIST

## 2022-01-27 PROCEDURE — 63600175 PHARM REV CODE 636 W HCPCS

## 2022-01-27 PROCEDURE — 27200651 HC AIRWAY, LMA: Performed by: ANESTHESIOLOGY

## 2022-01-27 DEVICE — STENT URETERAL FIRM 4.8FX26 ASCERTA: Type: IMPLANTABLE DEVICE | Site: URETER | Status: FUNCTIONAL

## 2022-01-27 RX ORDER — LIDOCAINE HYDROCHLORIDE 20 MG/ML
JELLY TOPICAL
Status: DISCONTINUED | OUTPATIENT
Start: 2022-01-27 | End: 2022-01-27 | Stop reason: HOSPADM

## 2022-01-27 RX ORDER — OXYCODONE HYDROCHLORIDE 5 MG/1
5 TABLET ORAL
Status: DISCONTINUED | OUTPATIENT
Start: 2022-01-27 | End: 2022-01-27 | Stop reason: HOSPADM

## 2022-01-27 RX ORDER — SODIUM CHLORIDE 0.9 % (FLUSH) 0.9 %
10 SYRINGE (ML) INJECTION
Status: DISCONTINUED | OUTPATIENT
Start: 2022-01-27 | End: 2022-01-27 | Stop reason: HOSPADM

## 2022-01-27 RX ORDER — SODIUM CHLORIDE, SODIUM LACTATE, POTASSIUM CHLORIDE, CALCIUM CHLORIDE 600; 310; 30; 20 MG/100ML; MG/100ML; MG/100ML; MG/100ML
INJECTION, SOLUTION INTRAVENOUS CONTINUOUS PRN
Status: DISCONTINUED | OUTPATIENT
Start: 2022-01-27 | End: 2022-01-27

## 2022-01-27 RX ORDER — ONDANSETRON 2 MG/ML
4 INJECTION INTRAMUSCULAR; INTRAVENOUS DAILY PRN
Status: DISCONTINUED | OUTPATIENT
Start: 2022-01-27 | End: 2022-01-27 | Stop reason: HOSPADM

## 2022-01-27 RX ORDER — ONDANSETRON 2 MG/ML
INJECTION INTRAMUSCULAR; INTRAVENOUS
Status: DISCONTINUED | OUTPATIENT
Start: 2022-01-27 | End: 2022-01-27

## 2022-01-27 RX ORDER — DIPHENHYDRAMINE HYDROCHLORIDE 50 MG/ML
12.5 INJECTION INTRAMUSCULAR; INTRAVENOUS
Status: DISCONTINUED | OUTPATIENT
Start: 2022-01-27 | End: 2022-01-27 | Stop reason: HOSPADM

## 2022-01-27 RX ORDER — HYDROMORPHONE HYDROCHLORIDE 1 MG/ML
0.2 INJECTION, SOLUTION INTRAMUSCULAR; INTRAVENOUS; SUBCUTANEOUS EVERY 5 MIN PRN
Status: DISCONTINUED | OUTPATIENT
Start: 2022-01-27 | End: 2022-01-27 | Stop reason: HOSPADM

## 2022-01-27 RX ORDER — LIDOCAINE HYDROCHLORIDE 20 MG/ML
INJECTION, SOLUTION EPIDURAL; INFILTRATION; INTRACAUDAL; PERINEURAL
Status: DISCONTINUED | OUTPATIENT
Start: 2022-01-27 | End: 2022-01-27

## 2022-01-27 RX ORDER — PROPOFOL 10 MG/ML
VIAL (ML) INTRAVENOUS
Status: DISCONTINUED | OUTPATIENT
Start: 2022-01-27 | End: 2022-01-27

## 2022-01-27 RX ORDER — MEPERIDINE HYDROCHLORIDE 50 MG/ML
12.5 INJECTION INTRAMUSCULAR; INTRAVENOUS; SUBCUTANEOUS EVERY 10 MIN PRN
Status: DISCONTINUED | OUTPATIENT
Start: 2022-01-27 | End: 2022-01-27 | Stop reason: HOSPADM

## 2022-01-27 RX ORDER — CEFAZOLIN SODIUM 2 G/50ML
2 SOLUTION INTRAVENOUS ONCE
Status: COMPLETED | OUTPATIENT
Start: 2022-01-27 | End: 2022-01-27

## 2022-01-27 RX ADMIN — ONDANSETRON 4 MG: 2 INJECTION INTRAMUSCULAR; INTRAVENOUS at 10:01

## 2022-01-27 RX ADMIN — LIDOCAINE HYDROCHLORIDE 60 MG: 20 INJECTION, SOLUTION EPIDURAL; INFILTRATION; INTRACAUDAL; PERINEURAL at 10:01

## 2022-01-27 RX ADMIN — CEFAZOLIN SODIUM 2 G: 2 SOLUTION INTRAVENOUS at 10:01

## 2022-01-27 RX ADMIN — PROPOFOL 70 MG: 10 INJECTION, EMULSION INTRAVENOUS at 10:01

## 2022-01-27 RX ADMIN — SODIUM CHLORIDE, SODIUM LACTATE, POTASSIUM CHLORIDE, AND CALCIUM CHLORIDE: .6; .31; .03; .02 INJECTION, SOLUTION INTRAVENOUS at 10:01

## 2022-01-27 NOTE — TRANSFER OF CARE
"Anesthesia Transfer of Care Note    Patient: Claudia Lerner    Procedure(s) Performed: Procedure(s) (LRB):  CYSTOSCOPY, WITH URETERAL STENT INSERTION-CHANGE (Right)    Patient location: PACU    Anesthesia Type: general    Transport from OR: Transported from OR on room air with adequate spontaneous ventilation    Post pain: adequate analgesia    Post assessment: no apparent anesthetic complications    Post vital signs: stable    Level of consciousness: awake    Nausea/Vomiting: no nausea/vomiting    Complications: none    Transfer of care protocol was followed      Last vitals:   Visit Vitals  BP (!) 167/71   Pulse 69   Temp 36.8 °C (98.2 °F) (Oral)   Resp 16   Ht 5' 1" (1.549 m)   Wt 57.2 kg (126 lb)   LMP  (LMP Unknown)   SpO2 95%   Breastfeeding No   BMI 23.81 kg/m²     "

## 2022-01-27 NOTE — DISCHARGE SUMMARY
Patient comes in for routine right ureteral stent exchange    Procedure is done w no complication    After a brief stay in recovery, patient is discharged in good condition    Meds given:  Ultracet    F/u office:    Three months

## 2022-01-27 NOTE — ANESTHESIA POSTPROCEDURE EVALUATION
Anesthesia Post Evaluation    Patient: Claudia Lerner    Procedure(s) Performed: Procedure(s) (LRB):  CYSTOSCOPY, WITH URETERAL STENT INSERTION (Right)    Final Anesthesia Type: general      Patient location during evaluation: PACU  Patient participation: Yes- Able to Participate  Level of consciousness: awake and alert  Post-procedure vital signs: reviewed and stable  Pain management: adequate  Airway patency: patent    PONV status at discharge: No PONV  Anesthetic complications: no      Cardiovascular status: hemodynamically stable  Respiratory status: unassisted, spontaneous ventilation and room air  Hydration status: euvolemic  Follow-up not needed.          Vitals Value Taken Time   /87 01/27/22 1131   Temp 36.6 °C (97.8 °F) 01/27/22 1130   Pulse 64 01/27/22 1140   Resp 32 01/27/22 1140   SpO2 98 % 01/27/22 1140   Vitals shown include unvalidated device data.      No case tracking events are documented in the log.      Pain/Julio César Score: Julio César Score: 10 (1/27/2022 11:30 AM)

## 2022-01-27 NOTE — H&P
[facility]  History & Physical    SUBJECTIVE:     Chief Complaint/Reason for Admission:     History of Present Illness:  Patient is a 81 y.o. female presents with   Long-standing right hydronephrosis  Patient not wanting any open procedures to fix ureteral stricture  Has been managed with periodic stent exchange  Here today to change her stent  PTA Medications   Medication Sig    aspirin (ECOTRIN) 81 MG EC tablet Take 81 mg by mouth once daily.    clopidogrel (PLAVIX) 75 mg tablet Take 75 mg by mouth once daily.    Lactobacillus rhamnosus GG (CULTURELLE) 10 billion cell capsule Take 1 capsule by mouth once daily.    letrozole (FEMARA) 2.5 mg Tab TAKE 1 TABLET(2.5 MG) BY MOUTH  Daily (Patient taking differently: 2.5 mg once daily TAKE 1 TABLET(2.5 MG) BY MOUTH  Daily.)    MULTIVIT WITH MINERALS/LUTEIN (MULTIVITAMIN 50 PLUS ORAL) Take 1 tablet by mouth every evening.     pumpkin seed extract/soy germ (AZO BLADDER CONTROL ORAL) Take 1 tablet by mouth 2 (two) times daily as needed.    timolol maleate 0.25% (TIMOPTIC) 0.25 % Drop Place 1 drop into the left eye 2 (two) times daily.     tramadol-acetaminophen 37.5-325 mg (ULTRACET) 37.5-325 mg Tab Take 1 tablet by mouth every 8 (eight) hours as needed for Pain.    ALPRAZolam (XANAX) 0.25 MG tablet Take 1 tablet (0.25 mg total) by mouth daily as needed for Anxiety.    dronedarone (MULTAQ) 400 mg Tab Take 1 tablet (400 mg total) by mouth 2 (two) times daily.    oxybutynin (DITROPAN) 5 MG Tab Take 1 tablet (5 mg total) by mouth once daily. (Patient taking differently: Take 5 mg by mouth daily as needed.)    pravastatin (PRAVACHOL) 40 MG tablet Take 1 tablet (40 mg total) by mouth once daily.       Review of patient's allergies indicates:   Allergen Reactions    Sulfa (sulfonamide antibiotics) Other (See Comments)     unknown    Codeine Palpitations       Past Medical History:   Diagnosis Date    A-fib 2017    history    AAA (abdominal aortic  aneurysm) 2017    5.2 cm-states not operable    Acquired hydroureter 2018    Adenocarcinoma 2017    left breast cancer , metastatic no chemo or surgery    Anemia     Aneurysm of thoracic aorta 03/16/2018    Carotid stenosis 2018    Cataract     COPD (chronic obstructive pulmonary disease) 2017    emphysema    Diarrhea     Fatigue     Glaucoma 2015    History of prolapse of bladder 2017    Hydronephrosis 2017    Hydropneumothorax 2017    LEFT    Hyperlipidemia 2019    Hypertension 2017    ovarian mass was removed    Hypokalemia     Hypotension     Kidney mass 2017    Macular degeneration 2011    PAD (peripheral artery disease)     PVD (peripheral vascular disease) 2018    Renal insufficiency 2017    Sepsis due to urinary tract infection 2017    Stroke 2017    no resisdual-daughter states reccents tests show no evidence of stroke    Ureteral obstruction, right 2017    Urinary incontinence 2005    UTI (urinary tract infection)      Past Surgical History:   Procedure Laterality Date    aortagram with runoff  03/16/2018    APPENDECTOMY  1965    CHOLECYSTECTOMY  1980    CYSTOSCOPY W/ RETROGRADES Right 5/28/2020    Procedure: CYSTOSCOPY, WITH RETROGRADE PYELOGRAM;  Surgeon: Mario Hernandez MD;  Location: [facility];  Service: Urology;  Laterality: Right;    CYSTOSCOPY W/ URETERAL STENT PLACEMENT Right 7/27/2018    Procedure: CYSTOSCOPY, WITH URETERAL STENT INSERTION;  Surgeon: Tremaine Victor MD;  Location: [facility];  Service: Urology;  Laterality: Right;  NORI VIDEO CONFIRMED 7/10/DME    CYSTOSCOPY W/ URETERAL STENT PLACEMENT Right 11/16/2018    Procedure: CYSTOSCOPY, WITH URETERAL STENT INSERTION;  Surgeon: Tremaine Victor MD;  Location: [facility];  Service: Urology;  Laterality: Right;  STYKER CONFIRMED 11/7/DME    CYSTOSCOPY W/ URETERAL STENT PLACEMENT Right 5/24/2019    Procedure: CYSTOSCOPY, WITH URETERAL STENT INSERTION;  Surgeon: Tremaine Victor MD;  Location: [facility];   Service: Urology;  Laterality: Right;  nori video confirmed 5/17/dme    CYSTOSCOPY W/ URETERAL STENT PLACEMENT Right 9/6/2019    Procedure: CYSTOSCOPY, WITH URETERAL STENT INSERTION;  Surgeon: Tremaine Victor MD;  Location: [facility] OR;  Service: Urology;  Laterality: Right;    CYSTOSCOPY W/ URETERAL STENT PLACEMENT Right 12/6/2019    Procedure: CYSTOSCOPY, WITH URETERAL STENT INSERTION;  Surgeon: Tremaine Victor MD;  Location: [facility] OR;  Service: Urology;  Laterality: Right;  NORI VIDEO CONFIRMED DME    CYSTOSCOPY W/ URETERAL STENT PLACEMENT Right 5/28/2020    Procedure: CYSTOSCOPY, WITH URETERAL STENT INSERTION;  Surgeon: Mario Hernandez MD;  Location: [facility];  Service: Urology;  Laterality: Right;    CYSTOSCOPY W/ URETERAL STENT PLACEMENT Right 11/5/2020    Procedure: CYSTOSCOPY, WITH URETERAL STENT INSERTION;  Surgeon: Mario Hernandez MD;  Location: [facility];  Service: Urology;  Laterality: Right;    CYSTOSCOPY W/ URETERAL STENT PLACEMENT Right 2/25/2021    Procedure: CYSTOSCOPY, WITH URETERAL STENT CHANGE;  Surgeon: Mario Hernandez MD;  Location: [facility];  Service: Urology;  Laterality: Right;    CYSTOSCOPY W/ URETERAL STENT PLACEMENT Right 6/10/2021    Procedure: CYSTOSCOPY, WITH URETERAL STENT CHANGE;  Surgeon: Mario Hernandez MD;  Location: [facility];  Service: Urology;  Laterality: Right;    CYSTOSCOPY W/ URETERAL STENT PLACEMENT Right 10/7/2021    Procedure: CYSTOSCOPY, WITH URETERAL STENT INSERTION;  Surgeon: Mario Hernandez MD;  Location: [facility];  Service: Urology;  Laterality: Right;    CYSTOSCOPY W/ URETERAL STENT REMOVAL Right 11/16/2018    Procedure: CYSTOSCOPY, WITH URETERAL STENT REMOVAL;  Surgeon: Tremaine Victor MD;  Location: [facility] OR;  Service: Urology;  Laterality: Right;    CYSTOSCOPY W/ URETERAL STENT REMOVAL Right 5/24/2019    Procedure: CYSTOSCOPY, WITH URETERAL STENT REMOVAL;  Surgeon: Tremaine Victor MD;  Location: [facility];  Service: Urology;  Laterality:  Right;    CYSTOSCOPY W/ URETERAL STENT REMOVAL Right 2019    Procedure: CYSTOSCOPY, WITH URETERAL STENT REMOVAL;  Surgeon: Tremaine Victor MD;  Location: [facility];  Service: Urology;  Laterality: Right;    CYSTOSCOPY W/ URETERAL STENT REMOVAL Right 2019    Procedure: CYSTOSCOPY, WITH URETERAL STENT REMOVAL;  Surgeon: Tremaine Victor MD;  Location: [facility] OR;  Service: Urology;  Laterality: Right;    CYSTOSCOPY W/ URETERAL STENT REMOVAL Right 2020    Procedure: CYSTOSCOPY, WITH URETERAL STENT REMOVAL;  Surgeon: Mario Hernandez MD;  Location: [facility] OR;  Service: Urology;  Laterality: Right;    CYSTOSCOPY W/ URETERAL STENT REMOVAL Right 2020    Procedure: CYSTOSCOPY, WITH URETERAL STENT REMOVAL;  Surgeon: Mario Hernandez MD;  Location: [facility] OR;  Service: Urology;  Laterality: Right;    cystoscopy with stent      HYSTERECTOMY  1965     Family History   Problem Relation Age of Onset    Heart disease Mother     Heart disease Father     Cancer Father     Diabetes Father     Heart disease Sister     Aneurysm Son      Social History     Tobacco Use    Smoking status: Former Smoker     Packs/day: 1.00     Years: 50.00     Pack years: 50.00     Types: Cigarettes     Quit date:      Years since quittin.0    Smokeless tobacco: Never Used   Substance Use Topics    Alcohol use: No    Drug use: No        Review of Systems:    Negative  OBJECTIVE:     Vital Signs (Most Recent):  Temp: 98.2 °F (36.8 °C) (22 0800)  Pulse: 69 (22 08)  Resp: 16 (22 08)  BP: (!) 167/71 (22 0801)  SpO2: 95 % (22 08)    Inpatient Medications:  Current Facility-Administered Medications   Medication Dose Route Frequency Provider Last Rate Last Admin    cefazolin (ANCEF) 2 gram in dextrose 5% 50 mL IVPB (premix)  2 g Intravenous Once Paper Order         Facility-Administered Medications Ordered in Other Encounters   Medication Dose Route Frequency Provider Last Rate Last  Admin    lactated ringers infusion   Intravenous Continuous Tremaine Victor MD 75 mL/hr at 05/24/19 0730 New Bag at 05/24/19 0730    lactated ringers infusion   Intravenous Continuous Tremaine Victor MD   New Bag at 12/06/19 0715          ASSESSMENT/PLAN:         Right ureteral stricture    Cystoscopy with stent exchange

## 2022-01-27 NOTE — PLAN OF CARE
1154- pt is breathing even unlabored with no complaints of pain at this time. Daughter at the bedside.

## 2022-01-27 NOTE — OP NOTE
Operative Note         SUMMARY     Surgery Date:  1/27/2022     Assistant:     Indications:  81-year-old female  With right ureteral stricture  Here for routine stent exchange      Pre-op Diagnosis:     Right ureteral stricture  Post-op Diagnosis:  Same    Procedure:  Cystoscopy with stent removal and replacement    Anesthesia: General    Description of Procedure:   Patient brought to cystoscopy suite.  Placed in the cystoscopy position.  Patient is prepped and draped.  Anesthesia is given.  We enter the urinary bladder with the 21 fr cystoscope.  No lesions were found within the bladder  We removed the stent with a grasper  We easily placed a Glidewire into the right ureter  Float a 5 [place]serratian double-J stent easily  Telescope was removed  To recovery in good condition    Findings/Key Components:          Estimated Blood Loss:   None

## 2022-01-27 NOTE — ANESTHESIA PREPROCEDURE EVALUATION
01/27/2022  Claudia Lerner is a 81 y.o., female.    Patient Active Problem List   Diagnosis    Malignant neoplasm of upper-outer quadrant of left female breast    Hydronephrosis    Acute hypoxemic respiratory failure    Hydropneumothorax    Renal insufficiency    Bladder prolapse, female, acquired    Essential hypertension    Macrocytic anemia    Tobacco use disorder    Acute respiratory failure with hypoxemia    Pneumothorax, left    Chronic atrial fibrillation    Hypokalemia    Paroxysmal atrial fibrillation    Weakness    Intractable pain    Sepsis due to urinary tract infection    Complicated UTI (urinary tract infection)    Acquired hydroureter    Acute blood loss anemia    PVD (peripheral vascular disease)    Hydronephrosis of right kidney       Past Surgical History:   Procedure Laterality Date    aortagram with runoff  03/16/2018    APPENDECTOMY  1965    CHOLECYSTECTOMY  1980    CYSTOSCOPY W/ RETROGRADES Right 5/28/2020    Procedure: CYSTOSCOPY, WITH RETROGRADE PYELOGRAM;  Surgeon: Mario Hernandez MD;  Location: [facility];  Service: Urology;  Laterality: Right;    CYSTOSCOPY W/ URETERAL STENT PLACEMENT Right 7/27/2018    Procedure: CYSTOSCOPY, WITH URETERAL STENT INSERTION;  Surgeon: Tremaine Victor MD;  Location[facility];  Service: Urology;  Laterality: Right;  RAFAEL VIDEO CONFIRMED 7/10/DME    CYSTOSCOPY W/ URETERAL STENT PLACEMENT Right 11/16/2018    Procedure: CYSTOSCOPY, WITH URETERAL STENT INSERTION;  Surgeon: Tremaine Victor MD;  Location: [facility] OR;  Service: Urology;  Laterality: Right;  STYKER CONFIRMED 11/7/DME    CYSTOSCOPY W/ URETERAL STENT PLACEMENT Right 5/24/2019    Procedure: CYSTOSCOPY, WITH URETERAL STENT INSERTION;  Surgeon: Tremaine Victor MD;  Location: [facility] OR;  Service: Urology;  Laterality: Right;  rafael video confirmed 5/17/dme     CYSTOSCOPY W/ URETERAL STENT PLACEMENT Right 9/6/2019    Procedure: CYSTOSCOPY, WITH URETERAL STENT INSERTION;  Surgeon: Tremaine Victor MD;  Location: [facility];  Service: Urology;  Laterality: Right;    CYSTOSCOPY W/ URETERAL STENT PLACEMENT Right 12/6/2019    Procedure: CYSTOSCOPY, WITH URETERAL STENT INSERTION;  Surgeon: Tremaine Victor MD;  Location: [facility];  Service: Urology;  Laterality: Right;  NORI VIDEO CONFIRMED DME    CYSTOSCOPY W/ URETERAL STENT PLACEMENT Right 5/28/2020    Procedure: CYSTOSCOPY, WITH URETERAL STENT INSERTION;  Surgeon: Mario Hernandez MD;  Location: [facility];  Service: Urology;  Laterality: Right;    CYSTOSCOPY W/ URETERAL STENT PLACEMENT Right 11/5/2020    Procedure: CYSTOSCOPY, WITH URETERAL STENT INSERTION;  Surgeon: Mario Hernandez MD;  Location: [facility];  Service: Urology;  Laterality: Right;    CYSTOSCOPY W/ URETERAL STENT PLACEMENT Right 2/25/2021    Procedure: CYSTOSCOPY, WITH URETERAL STENT CHANGE;  Surgeon: Mario Hernandez MD;  Location: [facility];  Service: Urology;  Laterality: Right;    CYSTOSCOPY W/ URETERAL STENT PLACEMENT Right 6/10/2021    Procedure: CYSTOSCOPY, WITH URETERAL STENT CHANGE;  Surgeon: Mario Hernandez MD;  Location: [facility];  Service: Urology;  Laterality: Right;    CYSTOSCOPY W/ URETERAL STENT PLACEMENT Right 10/7/2021    Procedure: CYSTOSCOPY, WITH URETERAL STENT INSERTION;  Surgeon: Mario Hernandez MD;  Location: [facility];  Service: Urology;  Laterality: Right;    CYSTOSCOPY W/ URETERAL STENT REMOVAL Right 11/16/2018    Procedure: CYSTOSCOPY, WITH URETERAL STENT REMOVAL;  Surgeon: Tremaine Victor MD;  Location: [facility];  Service: Urology;  Laterality: Right;    CYSTOSCOPY W/ URETERAL STENT REMOVAL Right 5/24/2019    Procedure: CYSTOSCOPY, WITH URETERAL STENT REMOVAL;  Surgeon: Tremaine Victor MD;  Location: [facility];  Service: Urology;  Laterality: Right;    CYSTOSCOPY W/ URETERAL STENT REMOVAL Right 9/6/2019    Procedure:  CYSTOSCOPY, WITH URETERAL STENT REMOVAL;  Surgeon: Tremaine Victor MD;  Location: [facility] OR;  Service: Urology;  Laterality: Right;    CYSTOSCOPY W/ URETERAL STENT REMOVAL Right 12/6/2019    Procedure: CYSTOSCOPY, WITH URETERAL STENT REMOVAL;  Surgeon: Tremaine Victor MD;  Location: [facility] OR;  Service: Urology;  Laterality: Right;    CYSTOSCOPY W/ URETERAL STENT REMOVAL Right 5/28/2020    Procedure: CYSTOSCOPY, WITH URETERAL STENT REMOVAL;  Surgeon: Mario Hernandez MD;  Location: [facility] OR;  Service: Urology;  Laterality: Right;    CYSTOSCOPY W/ URETERAL STENT REMOVAL Right 11/5/2020    Procedure: CYSTOSCOPY, WITH URETERAL STENT REMOVAL;  Surgeon: Mario Hernandez MD;  Location: [facility] OR;  Service: Urology;  Laterality: Right;    cystoscopy with stent      HYSTERECTOMY  1965        Tobacco Use:  The patient  reports that she quit smoking about 5 years ago. Her smoking use included cigarettes. She has a 50.00 pack-year smoking history. She has never used smokeless tobacco.     Results for orders placed or performed during the hospital encounter of 10/04/21   EKG 12-lead    Collection Time: 10/04/21 12:34 PM    Narrative    Test Reason : Z01.818,    Vent. Rate : 062 BPM     Atrial Rate : 062 BPM     P-R Int : 152 ms          QRS Dur : 076 ms      QT Int : 446 ms       P-R-T Axes : 054 -30 077 degrees     QTc Int : 452 ms    Normal sinus rhythm  Left axis deviation  Abnormal ECG  When compared with ECG of 22-FEB-2021 11:07,  Premature supraventricular complexes are no longer Present  Left anterior fascicular block is no longer Present  Confirmed by Maryanne QUEZADA, Sanjay CHAVEZ ([id number]) on 10/14/2021 10:31:26 AM    Referred By:  MICHELLE           Confirmed By:Sanjay Madden MD             Lab Results   Component Value Date    WBC 6.22 01/25/2022    HGB 11.3 (L) 01/25/2022    HCT 35.6 (L) 01/25/2022     (H) 01/25/2022     01/25/2022     BMP  Lab Results   Component Value Date     01/25/2022    K  4.8 01/25/2022     01/25/2022    CO2 21 (L) 01/25/2022    BUN 35 (H) 01/25/2022    CREATININE 1.3 01/25/2022    CALCIUM 9.3 01/25/2022    ANIONGAP 10 01/25/2022    ESTGFRAFRICA 43.8 (A) 01/25/2022    EGFRNONAA 38.0 (A) 01/25/2022             Pre-op Assessment    I have reviewed the Patient Summary Reports.     I have reviewed the Nursing Notes. I have reviewed the NPO Status.   I have reviewed the Medications.     Review of Systems  Anesthesia Hx:  Hx of Anesthetic complications (h/o disorientation once)  Denies Family Hx of Anesthesia complications.  Personal Hx of Anesthesia complications, Post-Operative Nausea/Vomiting, in the past, but not with recent anesthetics / prophylaxis   Social:  Former Smoker    Hematology/Oncology:  Hematology Normal   Oncology Normal     EENT/Dental:   Glaucoma and macular degeneration; can't see in one eye and receives retinal injections in her other eye      Cardiovascular:   Hypertension Dysrhythmias (hx of afib) atrial fibrillation PVD hyperlipidemia ECG has been reviewed.  Functional Capacity unable to determine   Denies Congenital Heart Disease.    Denies Congenital Heart Disease.   Denies Deep Venous Thrombosis (DVT)  Hypertension    Pulmonary:   COPD, moderate  Chronic Obstructive Pulmonary Disease (COPD):    Renal/:   Chronic Renal Disease, CRI renal calculi Hx of prolapse bladder Kidney Function/Disease, Chronic Kidney Disease (CKD)    Hepatic/GI:  Denies Liver Disease    Musculoskeletal:  Musculoskeletal Normal  Denies Rheumatic Disease    Neurological:   CVA, no residual symptoms  Denies Dx of Headaches Denies Seizure Disorder  CVA - Cerebrovasular Accident    Endocrine:  Endocrine Normal  Denies Diabetes    Psych:  Psychiatric Normal           Physical Exam  General:  Well nourished    Airway/Jaw/Neck:  Airway Findings: Mouth Opening: Normal General Airway Assessment: Adult  Mallampati: II  TM Distance: Normal, at least 6 cm      Dental:  Dental Findings:  Edentulous   Chest/Lungs:  Chest/Lungs Findings: Clear to auscultation     Heart/Vascular:  Heart Findings: Rhythm: Occasional Prematures        Mental Status:  Mental Status Findings:  Cooperative, Alert and Oriented         Anesthesia Plan  Type of Anesthesia, risks & benefits discussed:  Anesthesia Type:  general    Patient's Preference:   Plan Factors:          Intra-op Monitoring Plan: standard ASA monitors  Intra-op Monitoring Plan Comments:   Post Op Pain Control Plan: multimodal analgesia  Post Op Pain Control Plan Comments:     Induction:   IV  Beta Blocker:  Patient is not currently on a Beta-Blocker (No further documentation required).       Informed Consent: Patient understands risks and agrees with Anesthesia plan.  Questions answered. Anesthesia consent signed with patient.  ASA Score: 4     Day of Surgery Review of History & Physical: I have interviewed and examined the patient. I have reviewed the patient's H&P dated:    H&P update referred to the surgeon.     Anesthesia Plan Notes: General LMA OK; patient with significant confusion following recent anesthesia  No Versed and Minimal fentanyl  Zofran, Pepcid        Ready For Surgery From Anesthesia Perspective.

## 2022-04-25 ENCOUNTER — HOSPITAL ENCOUNTER (OUTPATIENT)
Dept: PREADMISSION TESTING | Facility: HOSPITAL | Age: 82
Discharge: HOME | End: 2022-04-25
Attending: SPECIALIST
Payer: MEDICARE

## 2022-04-25 ENCOUNTER — HOSPITAL ENCOUNTER (OUTPATIENT)
Dept: RADIOLOGY | Facility: HOSPITAL | Age: 82
Discharge: HOME | End: 2022-04-25
Attending: SPECIALIST
Payer: MEDICARE

## 2022-04-25 VITALS
TEMPERATURE: 98 F | HEIGHT: 60 IN | HEART RATE: 62 BPM | BODY MASS INDEX: 23.98 KG/M2 | SYSTOLIC BLOOD PRESSURE: 148 MMHG | RESPIRATION RATE: 18 BRPM | OXYGEN SATURATION: 98 % | DIASTOLIC BLOOD PRESSURE: 68 MMHG | WEIGHT: 122.13 LBS

## 2022-04-25 DIAGNOSIS — Z79.01 MONITORING FOR LONG-TERM ANTICOAGULANT USE: ICD-10-CM

## 2022-04-25 DIAGNOSIS — Z51.81 MONITORING FOR LONG-TERM ANTICOAGULANT USE: ICD-10-CM

## 2022-04-25 DIAGNOSIS — Z01.818 PRE-OP EXAMINATION: Primary | ICD-10-CM

## 2022-04-25 DIAGNOSIS — Z01.818 PRE-OP TESTING: ICD-10-CM

## 2022-04-25 DIAGNOSIS — Z01.818 PREOP TESTING: Primary | ICD-10-CM

## 2022-04-25 DIAGNOSIS — Z01.818 PRE-OP EXAMINATION: ICD-10-CM

## 2022-04-25 DIAGNOSIS — Z79.01 ANTICOAGULANT LONG-TERM USE: ICD-10-CM

## 2022-04-25 LAB
ALBUMIN SERPL BCP-MCNC: 3.7 G/DL (ref 3.5–5.2)
ALBUMIN SERPL BCP-MCNC: 3.7 G/DL (ref 3.5–5.2)
ALP SERPL-CCNC: 51 U/L (ref 55–135)
ALP SERPL-CCNC: 51 U/L (ref 55–135)
ALT SERPL W/O P-5'-P-CCNC: 12 U/L (ref 10–44)
ALT SERPL W/O P-5'-P-CCNC: 12 U/L (ref 10–44)
ANION GAP SERPL CALC-SCNC: 8 MMOL/L (ref 8–16)
ANION GAP SERPL CALC-SCNC: 8 MMOL/L (ref 8–16)
APTT PPP: 29.6 SEC (ref 23.3–35.1)
APTT PPP: 29.6 SEC (ref 23.3–35.1)
AST SERPL-CCNC: 19 U/L (ref 10–40)
AST SERPL-CCNC: 19 U/L (ref 10–40)
BASOPHILS # BLD AUTO: 0.03 K/UL (ref 0–0.2)
BASOPHILS NFR BLD: 0.5 % (ref 0–1.9)
BILIRUB SERPL-MCNC: 0.7 MG/DL (ref 0.1–1)
BILIRUB SERPL-MCNC: 0.7 MG/DL (ref 0.1–1)
BUN SERPL-MCNC: 32 MG/DL (ref 8–23)
BUN SERPL-MCNC: 32 MG/DL (ref 8–23)
CALCIUM SERPL-MCNC: 9.7 MG/DL (ref 8.7–10.5)
CALCIUM SERPL-MCNC: 9.7 MG/DL (ref 8.7–10.5)
CHLORIDE SERPL-SCNC: 115 MMOL/L (ref 95–110)
CHLORIDE SERPL-SCNC: 115 MMOL/L (ref 95–110)
CO2 SERPL-SCNC: 21 MMOL/L (ref 23–29)
CO2 SERPL-SCNC: 21 MMOL/L (ref 23–29)
CREAT SERPL-MCNC: 1.5 MG/DL (ref 0.5–1.4)
CREAT SERPL-MCNC: 1.5 MG/DL (ref 0.5–1.4)
DIFFERENTIAL METHOD: ABNORMAL
EOSINOPHIL # BLD AUTO: 0.2 K/UL (ref 0–0.5)
EOSINOPHIL NFR BLD: 3.6 % (ref 0–8)
ERYTHROCYTE [DISTWIDTH] IN BLOOD BY AUTOMATED COUNT: 13.7 % (ref 11.5–14.5)
ERYTHROCYTE [DISTWIDTH] IN BLOOD BY AUTOMATED COUNT: 13.7 % (ref 11.5–14.5)
EST. GFR  (AFRICAN AMERICAN): 36.9 ML/MIN/1.73 M^2
EST. GFR  (AFRICAN AMERICAN): 36.9 ML/MIN/1.73 M^2
EST. GFR  (NON AFRICAN AMERICAN): 32 ML/MIN/1.73 M^2
EST. GFR  (NON AFRICAN AMERICAN): 32 ML/MIN/1.73 M^2
GLUCOSE SERPL-MCNC: 105 MG/DL (ref 70–110)
GLUCOSE SERPL-MCNC: 105 MG/DL (ref 70–110)
HCT VFR BLD AUTO: 36.2 % (ref 37–48.5)
HCT VFR BLD AUTO: 36.2 % (ref 37–48.5)
HGB BLD-MCNC: 11.4 G/DL (ref 12–16)
HGB BLD-MCNC: 11.4 G/DL (ref 12–16)
IMM GRANULOCYTES # BLD AUTO: 0.02 K/UL (ref 0–0.04)
IMM GRANULOCYTES NFR BLD AUTO: 0.3 % (ref 0–0.5)
INR PPP: 1.2
INR PPP: 1.2
LYMPHOCYTES # BLD AUTO: 1.6 K/UL (ref 1–4.8)
LYMPHOCYTES NFR BLD: 27.6 % (ref 18–48)
MCH RBC QN AUTO: 31.4 PG (ref 27–31)
MCH RBC QN AUTO: 31.4 PG (ref 27–31)
MCHC RBC AUTO-ENTMCNC: 31.5 G/DL (ref 32–36)
MCHC RBC AUTO-ENTMCNC: 31.5 G/DL (ref 32–36)
MCV RBC AUTO: 100 FL (ref 82–98)
MCV RBC AUTO: 100 FL (ref 82–98)
MONOCYTES # BLD AUTO: 0.4 K/UL (ref 0.3–1)
MONOCYTES NFR BLD: 7.3 % (ref 4–15)
NEUTROPHILS # BLD AUTO: 3.6 K/UL (ref 1.8–7.7)
NEUTROPHILS NFR BLD: 60.7 % (ref 38–73)
NRBC BLD-RTO: 0 /100 WBC
PLATELET # BLD AUTO: 158 K/UL (ref 150–450)
PLATELET # BLD AUTO: 158 K/UL (ref 150–450)
PMV BLD AUTO: 10.3 FL (ref 9.2–12.9)
PMV BLD AUTO: 10.3 FL (ref 9.2–12.9)
POTASSIUM SERPL-SCNC: 4 MMOL/L (ref 3.5–5.1)
POTASSIUM SERPL-SCNC: 4 MMOL/L (ref 3.5–5.1)
PROT SERPL-MCNC: 7.3 G/DL (ref 6–8.4)
PROT SERPL-MCNC: 7.3 G/DL (ref 6–8.4)
PROTHROMBIN TIME: 14.5 SEC (ref 11.4–13.7)
PROTHROMBIN TIME: 14.5 SEC (ref 11.4–13.7)
RBC # BLD AUTO: 3.63 M/UL (ref 4–5.4)
RBC # BLD AUTO: 3.63 M/UL (ref 4–5.4)
SODIUM SERPL-SCNC: 144 MMOL/L (ref 136–145)
SODIUM SERPL-SCNC: 144 MMOL/L (ref 136–145)
WBC # BLD AUTO: 5.9 K/UL (ref 3.9–12.7)
WBC # BLD AUTO: 5.9 K/UL (ref 3.9–12.7)

## 2022-04-25 PROCEDURE — 93010 ELECTROCARDIOGRAM REPORT: CPT | Mod: ,,, | Performed by: INTERNAL MEDICINE

## 2022-04-25 PROCEDURE — 85730 THROMBOPLASTIN TIME PARTIAL: CPT | Performed by: SPECIALIST

## 2022-04-25 PROCEDURE — 85025 COMPLETE CBC W/AUTO DIFF WBC: CPT | Performed by: SPECIALIST

## 2022-04-25 PROCEDURE — 93010 EKG 12-LEAD: ICD-10-PCS | Mod: ,,, | Performed by: INTERNAL MEDICINE

## 2022-04-25 PROCEDURE — 93005 ELECTROCARDIOGRAM TRACING: CPT | Performed by: INTERNAL MEDICINE

## 2022-04-25 PROCEDURE — 85610 PROTHROMBIN TIME: CPT | Performed by: SPECIALIST

## 2022-04-25 PROCEDURE — 80053 COMPREHEN METABOLIC PANEL: CPT | Performed by: SPECIALIST

## 2022-04-25 RX ORDER — CEFAZOLIN SODIUM 2 G/50ML
2 SOLUTION INTRAVENOUS ONCE
Status: CANCELLED | OUTPATIENT
Start: 2022-04-25

## 2022-04-25 NOTE — DISCHARGE INSTRUCTIONS
To confirm, Your doctor has instructed you that surgery is scheduled for: 4/28/22    Pre-Op will call the afternoon prior to surgery between 4:00 and 6:00 PM with the final arrival time.      Please report to [facility] on [address] the morning of surgery.     Do not eat or drink anything after midnight the night before your surgery - THIS INCLUDES  WATER, GUM, MINTS AND CANDY.  YOU MAY BRUSH YOUR TEETH BUT DO NOT SWALLOW     TAKE ONLY THESE MEDICATIONS WITH A SMALL SIP OF WATER THE MORNING OF YOUR PROCEDURE:      ONLY if you are diabetic, check your sugar in the morning before your procedure.       Do not take any diabetic medicines or insulin the morning of surgery .     PLEASE NOTE:  The surgery schedule has many variables which may affect the time of your surgery case.  Family members should be available if your surgery time changes.  Plan to be here the day of your procedure between 4-6 hours.      DO NOT TAKE THESE MEDICATIONS 5-7 DAYS PRIOR to your procedure or per your surgeon's request: ASPIRIN, ALEVE, ADVIL, IBUPROFEN,  JANEL SELTZER, BC , FISH OIL , VITAMIN E, HERBALS  (May take Tylenol)      ONLY if you are prescribed any types of blood thinners such as:  Aspirin, Coumadin, Plavix, Pradaxa, Xarelto, Aggrenox, Effient, Eliquis, Savasya, Brilinta, or any other, ask your surgeon whether you should stop taking them and how long before surgery you should stop.  You may also need to verify with the prescribing physician if it is ok to stop your medication.                                                        IMPORTANT INSTRUCTIONS      Do not smoke, vape or drink alcoholic beverages 24 hours prior to your procedure.  Shower the night before AND the morning of your procedure with a Chlorhexidine wash such as Hibiclens or Dial antibacterial soap from the neck down.    Do not get it on your face or in your eyes.  You may use your own shampoo and face wash. This helps your skin to be as bacteria free as  possible.    DO NOT remove hair from the surgery site.  Do not shave the incision site unless you are given specific instructions to do so.    Sleep in a bed with clean sheets.  Do not sleep with a pet in the bed.   If you wear contact lenses, dentures, hearing aids or glasses, bring a container to put them in during surgery and give to a family member for safe keeping.    Please leave all jewelry, piercing's and valuables at home.   ONLY if you have been diagnosed with sleep apnea please bring your C-PAP machine.  ONLY if you wear home oxygen please bring your portable oxygen tank the day of your procedure.   ONLY for patients requiring bowel prep, written instructions will be given by your doctor's office.  ONLY if you have a neuro stimulator, please bring the controller with you the morning of surgery  ONLY if a type and screen test is needed before surgery, please return:  If your doctor has scheduled you for an overnight stay, bring a small overnight bag with any personal items you need.    Make arrangements in advance for transportation home by a responsible adult.      You must make arrangements for transportation, TAXI'S, UBER'S OR LYFTS ARE NOT ALLOWED.        If you have any questions about these instructions, call Pre-Op Admit  Nursing at [phone] or the Pre-Op Day Surgery Unit at [phone].

## 2022-04-28 ENCOUNTER — ANESTHESIA EVENT (OUTPATIENT)
Dept: SURGERY | Facility: HOSPITAL | Age: 82
End: 2022-04-28
Payer: MEDICARE

## 2022-04-28 ENCOUNTER — ANESTHESIA (OUTPATIENT)
Dept: SURGERY | Facility: HOSPITAL | Age: 82
End: 2022-04-28
Payer: MEDICARE

## 2022-04-28 ENCOUNTER — HOSPITAL ENCOUNTER (OUTPATIENT)
Facility: HOSPITAL | Age: 82
Discharge: HOME | End: 2022-04-28
Attending: SPECIALIST | Admitting: SPECIALIST
Payer: MEDICARE

## 2022-04-28 VITALS
WEIGHT: 122 LBS | DIASTOLIC BLOOD PRESSURE: 69 MMHG | SYSTOLIC BLOOD PRESSURE: 131 MMHG | OXYGEN SATURATION: 98 % | RESPIRATION RATE: 16 BRPM | HEART RATE: 79 BPM | TEMPERATURE: 98 F | BODY MASS INDEX: 23.95 KG/M2 | HEIGHT: 60 IN

## 2022-04-28 DIAGNOSIS — Z01.818 PRE-OP TESTING: ICD-10-CM

## 2022-04-28 DIAGNOSIS — N13.30 HYDRONEPHROSIS OF RIGHT KIDNEY: Primary | ICD-10-CM

## 2022-04-28 DIAGNOSIS — N13.5 URETERAL STRICTURE: ICD-10-CM

## 2022-04-28 DIAGNOSIS — Z01.818 PREOP TESTING: ICD-10-CM

## 2022-04-28 PROCEDURE — 63600175 PHARM REV CODE 636 W HCPCS: Performed by: NURSE ANESTHETIST, CERTIFIED REGISTERED

## 2022-04-28 PROCEDURE — 25000003 PHARM REV CODE 250: Performed by: SPECIALIST

## 2022-04-28 PROCEDURE — 37000009 HC ANESTHESIA EA ADD 15 MINS: Performed by: SPECIALIST

## 2022-04-28 PROCEDURE — 71000033 HC RECOVERY, INTIAL HOUR: Performed by: SPECIALIST

## 2022-04-28 PROCEDURE — 37000008 HC ANESTHESIA 1ST 15 MINUTES: Performed by: SPECIALIST

## 2022-04-28 PROCEDURE — 27201423 OPTIME MED/SURG SUP & DEVICES STERILE SUPPLY: Performed by: SPECIALIST

## 2022-04-28 PROCEDURE — 36000707: Performed by: SPECIALIST

## 2022-04-28 PROCEDURE — 25000003 PHARM REV CODE 250: Performed by: NURSE ANESTHETIST, CERTIFIED REGISTERED

## 2022-04-28 PROCEDURE — 71000039 HC RECOVERY, EACH ADD'L HOUR: Performed by: SPECIALIST

## 2022-04-28 PROCEDURE — C2617 STENT, NON-COR, TEM W/O DEL: HCPCS | Performed by: SPECIALIST

## 2022-04-28 PROCEDURE — 63600175 PHARM REV CODE 636 W HCPCS: Performed by: SPECIALIST

## 2022-04-28 PROCEDURE — C1769 GUIDE WIRE: HCPCS | Performed by: SPECIALIST

## 2022-04-28 PROCEDURE — 71000015 HC POSTOP RECOV 1ST HR: Performed by: SPECIALIST

## 2022-04-28 PROCEDURE — 36000706: Performed by: SPECIALIST

## 2022-04-28 DEVICE — IMPLANTABLE DEVICE: Type: IMPLANTABLE DEVICE | Site: URETER | Status: FUNCTIONAL

## 2022-04-28 RX ORDER — SODIUM CHLORIDE 0.9 % (FLUSH) 0.9 %
10 SYRINGE (ML) INJECTION
Status: DISCONTINUED | OUTPATIENT
Start: 2022-04-28 | End: 2022-04-28 | Stop reason: HOSPADM

## 2022-04-28 RX ORDER — FENTANYL CITRATE 50 UG/ML
25 INJECTION, SOLUTION INTRAMUSCULAR; INTRAVENOUS EVERY 5 MIN PRN
Status: DISCONTINUED | OUTPATIENT
Start: 2022-04-28 | End: 2022-04-28 | Stop reason: HOSPADM

## 2022-04-28 RX ORDER — CEFAZOLIN SODIUM 2 G/50ML
2 SOLUTION INTRAVENOUS ONCE
Status: COMPLETED | OUTPATIENT
Start: 2022-04-28 | End: 2022-04-28

## 2022-04-28 RX ORDER — MEPERIDINE HYDROCHLORIDE 50 MG/ML
12.5 INJECTION INTRAMUSCULAR; INTRAVENOUS; SUBCUTANEOUS EVERY 10 MIN PRN
Status: DISCONTINUED | OUTPATIENT
Start: 2022-04-28 | End: 2022-04-28 | Stop reason: HOSPADM

## 2022-04-28 RX ORDER — PROPOFOL 10 MG/ML
VIAL (ML) INTRAVENOUS
Status: DISCONTINUED | OUTPATIENT
Start: 2022-04-28 | End: 2022-04-28

## 2022-04-28 RX ORDER — TRAMADOL HYDROCHLORIDE AND ACETAMINOPHEN 37.5; 325 MG/1; MG/1
1 TABLET, FILM COATED ORAL EVERY 6 HOURS PRN
Qty: 12 TABLET | Refills: 0
Start: 2022-04-28

## 2022-04-28 RX ORDER — ONDANSETRON 2 MG/ML
INJECTION INTRAMUSCULAR; INTRAVENOUS
Status: DISCONTINUED | OUTPATIENT
Start: 2022-04-28 | End: 2022-04-28

## 2022-04-28 RX ORDER — ONDANSETRON 2 MG/ML
4 INJECTION INTRAMUSCULAR; INTRAVENOUS DAILY PRN
Status: DISCONTINUED | OUTPATIENT
Start: 2022-04-28 | End: 2022-04-28 | Stop reason: HOSPADM

## 2022-04-28 RX ORDER — EPHEDRINE SULFATE 50 MG/ML
INJECTION, SOLUTION INTRAVENOUS
Status: DISCONTINUED | OUTPATIENT
Start: 2022-04-28 | End: 2022-04-28

## 2022-04-28 RX ORDER — LIDOCAINE HYDROCHLORIDE 20 MG/ML
JELLY TOPICAL
Status: DISCONTINUED | OUTPATIENT
Start: 2022-04-28 | End: 2022-04-28

## 2022-04-28 RX ORDER — HYDROCODONE BITARTRATE AND ACETAMINOPHEN 5; 325 MG/1; MG/1
1 TABLET ORAL EVERY 4 HOURS PRN
Status: DISCONTINUED | OUTPATIENT
Start: 2022-04-28 | End: 2022-04-28 | Stop reason: HOSPADM

## 2022-04-28 RX ORDER — FENTANYL CITRATE 50 UG/ML
INJECTION, SOLUTION INTRAMUSCULAR; INTRAVENOUS
Status: DISCONTINUED | OUTPATIENT
Start: 2022-04-28 | End: 2022-04-28

## 2022-04-28 RX ORDER — SODIUM CHLORIDE, SODIUM LACTATE, POTASSIUM CHLORIDE, CALCIUM CHLORIDE 600; 310; 30; 20 MG/100ML; MG/100ML; MG/100ML; MG/100ML
INJECTION, SOLUTION INTRAVENOUS CONTINUOUS PRN
Status: DISCONTINUED | OUTPATIENT
Start: 2022-04-28 | End: 2022-04-28

## 2022-04-28 RX ORDER — LIDOCAINE HYDROCHLORIDE 20 MG/ML
INJECTION, SOLUTION EPIDURAL; INFILTRATION; INTRACAUDAL; PERINEURAL
Status: DISCONTINUED | OUTPATIENT
Start: 2022-04-28 | End: 2022-04-28

## 2022-04-28 RX ORDER — LIDOCAINE HYDROCHLORIDE 20 MG/ML
JELLY TOPICAL
Status: DISCONTINUED | OUTPATIENT
Start: 2022-04-28 | End: 2022-04-28 | Stop reason: HOSPADM

## 2022-04-28 RX ORDER — DIPHENHYDRAMINE HYDROCHLORIDE 50 MG/ML
12.5 INJECTION INTRAMUSCULAR; INTRAVENOUS
Status: DISCONTINUED | OUTPATIENT
Start: 2022-04-28 | End: 2022-04-28 | Stop reason: HOSPADM

## 2022-04-28 RX ADMIN — SODIUM CHLORIDE, SODIUM LACTATE, POTASSIUM CHLORIDE, AND CALCIUM CHLORIDE: .6; .31; .03; .02 INJECTION, SOLUTION INTRAVENOUS at 10:04

## 2022-04-28 RX ADMIN — EPHEDRINE SULFATE 10 MG: 50 INJECTION INTRAVENOUS at 10:04

## 2022-04-28 RX ADMIN — CEFAZOLIN SODIUM 2 G: 2 SOLUTION INTRAVENOUS at 10:04

## 2022-04-28 RX ADMIN — ONDANSETRON 4 MG: 2 INJECTION INTRAMUSCULAR; INTRAVENOUS at 10:04

## 2022-04-28 RX ADMIN — PROPOFOL 80 MG: 10 INJECTION, EMULSION INTRAVENOUS at 10:04

## 2022-04-28 RX ADMIN — LIDOCAINE HYDROCHLORIDE 2 ML: 20 JELLY TOPICAL at 10:04

## 2022-04-28 RX ADMIN — FENTANYL CITRATE 50 MCG: 50 INJECTION INTRAMUSCULAR; INTRAVENOUS at 10:04

## 2022-04-28 RX ADMIN — LIDOCAINE HYDROCHLORIDE 60 MG: 20 INJECTION, SOLUTION INTRAVENOUS at 10:04

## 2022-04-28 NOTE — OP NOTE
Operative Note         SUMMARY     Surgery Date:  4/28/2022     Assistant:     Indications:  81-year-old female with hydronephrosis  Here for her cystoscopic stent change    Pre-op Diagnosis:   Right hydronephrosis    Post-op Diagnosis:  Same    Procedure:  Cystoscopic stent change    Anesthesia: General    Description of Procedure:   Patient brought to cystoscopy suite.  Placed in the cystoscopy position.  Patient is prepped and draped.  Anesthesia is given.  We enter the urinary bladder with the 21 fr cystoscope.  The stent was removed easily  We then floated a Glidewire  The stent is in flowed over the wire  Proximal end of the stent does going to the lower pole calyx  Think this is serviceable at this point  Telescope was removed and she taken to recovery      Findings/Key Components:          Estimated Blood Loss:    None

## 2022-04-28 NOTE — H&P
[facility]  History & Physical    SUBJECTIVE:     Chief Complaint/Reason for Admission:     History of Present Illness:  Patient is a 81 y.o. female presents with long history of right hydronephrosis  We have manages patient with cystoscopic stent change  Here today for the above    PTA Medications   Medication Sig    ALPRAZolam (XANAX) 0.25 MG tablet Take 1 tablet (0.25 mg total) by mouth daily as needed for Anxiety.    dronedarone (MULTAQ) 400 mg Tab Take 1 tablet (400 mg total) by mouth 2 (two) times daily.    letrozole (FEMARA) 2.5 mg Tab TAKE 1 TABLET(2.5 MG) BY MOUTH  Daily (Patient taking differently: 2.5 mg once daily TAKE 1 TABLET(2.5 MG) BY MOUTH  Daily.)    MULTIVIT WITH MINERALS/LUTEIN (MULTIVITAMIN 50 PLUS ORAL) Take 1 tablet by mouth every evening.     pravastatin (PRAVACHOL) 40 MG tablet Take 1 tablet (40 mg total) by mouth once daily.    timolol maleate 0.25% (TIMOPTIC) 0.25 % Drop Place 1 drop into the left eye 2 (two) times daily.     aspirin (ECOTRIN) 81 MG EC tablet Take 81 mg by mouth once daily.    clopidogrel (PLAVIX) 75 mg tablet Take 75 mg by mouth once daily.    oxybutynin (DITROPAN) 5 MG Tab Take 1 tablet (5 mg total) by mouth once daily. (Patient taking differently: Take 5 mg by mouth daily as needed.)       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/2018    Carotid stenosis 2018    Cataract     COPD (chronic obstructive pulmonary disease) 2017    emphysema    Diarrhea     Fatigue     Glaucoma 2015    History of prolapse of bladder 2017     Hydronephrosis 2017    Hydropneumothorax 2017    LEFT    Hyperlipidemia 2019    Hypertension 2017    ovarian mass was removed    Hypokalemia     Hypotension     Kidney mass 2017    Macular degeneration 2011    PAD (peripheral artery disease)     PVD (peripheral vascular disease) 2018    Renal insufficiency 2017    Sepsis due to urinary tract infection 2017    Stroke 2017    no resisdual-daughter states reccents tests show no evidence of stroke    Ureteral obstruction, right 2017    Urinary incontinence 2005    UTI (urinary tract infection)      Past Surgical History:   Procedure Laterality Date    aortagram with runoff  03/16/2018    APPENDECTOMY  1965    CHOLECYSTECTOMY  1980    CYSTOSCOPY W/ RETROGRADES Right 5/28/2020    Procedure: CYSTOSCOPY, WITH RETROGRADE PYELOGRAM;  Surgeon: Mario Hernandez MD;  Location: [facility];  Service: Urology;  Laterality: Right;    CYSTOSCOPY W/ URETERAL STENT PLACEMENT Right 7/27/2018    Procedure: CYSTOSCOPY, WITH URETERAL STENT INSERTION;  Surgeon: Tremaine Victor MD;  Location: [facility];  Service: Urology;  Laterality: Right;  NORI VIDEO CONFIRMED 7/10/DME    CYSTOSCOPY W/ URETERAL STENT PLACEMENT Right 11/16/2018    Procedure: CYSTOSCOPY, WITH URETERAL STENT INSERTION;  Surgeon: Tremaine Victor MD;  Location: [facility];  Service: Urology;  Laterality: Right;  STYKER CONFIRMED 11/7/DME    CYSTOSCOPY W/ URETERAL STENT PLACEMENT Right 5/24/2019    Procedure: CYSTOSCOPY, WITH URETERAL STENT INSERTION;  Surgeon: Tremaine Victor MD;  Location: [facility];  Service: Urology;  Laterality: Right;  nori video confirmed 5/17/dme    CYSTOSCOPY W/ URETERAL STENT PLACEMENT Right 9/6/2019    Procedure: CYSTOSCOPY, WITH URETERAL STENT INSERTION;  Surgeon: Tremaine Victor MD;  Location: [facility];  Service: Urology;  Laterality: Right;    CYSTOSCOPY W/ URETERAL STENT PLACEMENT Right 12/6/2019    Procedure: CYSTOSCOPY, WITH URETERAL STENT INSERTION;  Surgeon: Tremaine  OJSE Victor MD;  Location: [facility];  Service: Urology;  Laterality: Right;  NORI VIDEO CONFIRMED DME    CYSTOSCOPY W/ URETERAL STENT PLACEMENT Right 5/28/2020    Procedure: CYSTOSCOPY, WITH URETERAL STENT INSERTION;  Surgeon: Mario Hernandez MD;  Location: [facility] OR;  Service: Urology;  Laterality: Right;    CYSTOSCOPY W/ URETERAL STENT PLACEMENT Right 11/5/2020    Procedure: CYSTOSCOPY, WITH URETERAL STENT INSERTION;  Surgeon: Mario Hernandez MD;  Location: [facility];  Service: Urology;  Laterality: Right;    CYSTOSCOPY W/ URETERAL STENT PLACEMENT Right 2/25/2021    Procedure: CYSTOSCOPY, WITH URETERAL STENT CHANGE;  Surgeon: Mario Hernandez MD;  Location: [facility];  Service: Urology;  Laterality: Right;    CYSTOSCOPY W/ URETERAL STENT PLACEMENT Right 6/10/2021    Procedure: CYSTOSCOPY, WITH URETERAL STENT CHANGE;  Surgeon: Mario Hernandez MD;  Location: [facility];  Service: Urology;  Laterality: Right;    CYSTOSCOPY W/ URETERAL STENT PLACEMENT Right 10/7/2021    Procedure: CYSTOSCOPY, WITH URETERAL STENT INSERTION;  Surgeon: Mario Hernandez MD;  Location: [facility];  Service: Urology;  Laterality: Right;    CYSTOSCOPY W/ URETERAL STENT PLACEMENT N/A 1/27/2022    Procedure: CYSTOSCOPY, WITH URETERAL STENT INSERTION;  Surgeon: Mario Hernandez MD;  Location: [facility];  Service: Urology;  Laterality: N/A;    CYSTOSCOPY W/ URETERAL STENT REMOVAL Right 11/16/2018    Procedure: CYSTOSCOPY, WITH URETERAL STENT REMOVAL;  Surgeon: Tremaine Victor MD;  Location: [facility];  Service: Urology;  Laterality: Right;    CYSTOSCOPY W/ URETERAL STENT REMOVAL Right 5/24/2019    Procedure: CYSTOSCOPY, WITH URETERAL STENT REMOVAL;  Surgeon: Tremaine Victor MD;  Location: [facility];  Service: Urology;  Laterality: Right;    CYSTOSCOPY W/ URETERAL STENT REMOVAL Right 9/6/2019    Procedure: CYSTOSCOPY, WITH URETERAL STENT REMOVAL;  Surgeon: Tremaine Victor MD;  Location: [facility];  Service: Urology;  Laterality:  Right;    CYSTOSCOPY W/ URETERAL STENT REMOVAL Right 12/6/2019    Procedure: CYSTOSCOPY, WITH URETERAL STENT REMOVAL;  Surgeon: Tremaine Victor MD;  Location: [facility] OR;  Service: Urology;  Laterality: Right;    CYSTOSCOPY W/ URETERAL STENT REMOVAL Right 5/28/2020    Procedure: CYSTOSCOPY, WITH URETERAL STENT REMOVAL;  Surgeon: Mario Hernandez MD;  Location: [facility] OR;  Service: Urology;  Laterality: Right;    CYSTOSCOPY W/ URETERAL STENT REMOVAL Right 11/5/2020    Procedure: CYSTOSCOPY, WITH URETERAL STENT REMOVAL;  Surgeon: Mario Hernandez MD;  Location: [facility] OR;  Service: Urology;  Laterality: Right;    CYSTOSCOPY W/ URETERAL STENT REMOVAL Right 1/27/2022    Procedure: CYSTOSCOPY, WITH URETERAL STENT REMOVAL;  Surgeon: Mario Hernandez MD;  Location: [facility] OR;  Service: Urology;  Laterality: Right;    cystoscopy with stent      HYSTERECTOMY  1965     Family History   Problem Relation Age of Onset    Heart disease Mother     Heart disease Father     Cancer Father     Diabetes Father     Heart disease Sister     Aneurysm Son      Social History     Tobacco Use    Smoking status: Former Smoker     Packs/day: 1.00     Years: 50.00     Pack years: 50.00     Types: Cigarettes     Quit date: 1/1/2019     Years since quitting: 3.3    Smokeless tobacco: Never Used   Substance Use Topics    Alcohol use: No    Drug use: No        Review of Systems:  Negative    OBJECTIVE:     Vital Signs (Most Recent):  Temp: 97.8 °F (36.6 °C) (04/28/22 0917)  Pulse: 67 (04/28/22 0917)  Resp: 17 (04/28/22 0917)  BP: 119/64 (04/28/22 0917)  SpO2: 99 % (04/28/22 0917)    Inpatient Medications:  Current Facility-Administered Medications   Medication Dose Route Frequency Provider Last Rate Last Admin    diphenhydrAMINE injection 12.5 mg  12.5 mg Intravenous Q15 Min PRN Piyush Jamil MD        fentaNYL 50 mcg/mL injection 25 mcg  25 mcg Intravenous Q5 Min PRN Piyush Jamil MD        meperidine injection 12.5  mg  12.5 mg Intravenous Q10 Min PRN Piyush Jamil MD        ondansetron injection 4 mg  4 mg Intravenous Daily PRN Piyush Jamil MD        sodium chloride 0.9% flush 10 mL  10 mL Intravenous PRN Piyush Jamil MD         Facility-Administered Medications Ordered in Other Encounters   Medication Dose Route Frequency Provider Last Rate Last Admin    lactated ringers infusion   Intravenous Continuous Tremaine Victor MD 75 mL/hr at 05/24/19 0730 New Bag at 05/24/19 0730    lactated ringers infusion   Intravenous Continuous Tremaine Victor MD   New Bag at 12/06/19 0715          ASSESSMENT/PLAN:       Right hydronephrosis        Cystoscopy with stent exchange

## 2022-04-28 NOTE — PLAN OF CARE
Pt tolerating oral liquids and voiding without difficulty, vital signs stable, pt ready for discharge

## 2022-04-28 NOTE — ANESTHESIA POSTPROCEDURE EVALUATION
Anesthesia Post Evaluation    Patient: Claudia Lerner    Procedure(s) Performed: Procedure(s) (LRB):  CYSTOSCOPY, WITH URETERAL STENT CHANGE (N/A)    Final Anesthesia Type: general      Patient location during evaluation: PACU  Patient participation: Yes- Able to Participate  Level of consciousness: awake and alert and oriented  Post-procedure vital signs: reviewed and stable  Pain management: adequate  Airway patency: patent    PONV status at discharge: No PONV  Anesthetic complications: no      Cardiovascular status: blood pressure returned to baseline and hemodynamically stable  Respiratory status: unassisted, spontaneous ventilation and room air  Hydration status: euvolemic  Follow-up not needed.          Vitals Value Taken Time   /69 04/28/22 1215   Temp 36.5 °C (97.7 °F) 04/28/22 1215   Pulse 76 04/28/22 1220   Resp 21 04/28/22 1220   SpO2 97 % 04/28/22 1220   Vitals shown include unvalidated device data.      Event Time   Out of Recovery 12:23:47         Pain/Julio César Score: Julio César Score: 10 (4/28/2022 12:15 PM)

## 2022-04-28 NOTE — PROGRESS NOTES
Pt arrived to PACU 4 via stretcher. AAO x 4, Respirations even and unlabored - 100% on room air, heart rate initially elevated at 121, decreased to 70s within moments of arrival. No bleeding noted to vaginal area.  Cardiac monitoring initiated. Continuous pulse oximetry initiated. No complaints at time of arrival

## 2022-04-28 NOTE — DISCHARGE SUMMARY
Patient comes in for cystoscopic stent change    Procedure is done w no complication    After a brief stay in recovery, patient is discharged in good condition    Meds given:  Ultracet  F/u office:  2 weeks with HECTOR

## 2022-04-28 NOTE — ANESTHESIA PREPROCEDURE EVALUATION
04/28/2022  Claudia Lerner is a 81 y.o., female.    Patient Active Problem List   Diagnosis    Malignant neoplasm of upper-outer quadrant of left female breast    Hydronephrosis    Acute hypoxemic respiratory failure    Hydropneumothorax    Renal insufficiency    Bladder prolapse, female, acquired    Essential hypertension    Macrocytic anemia    Tobacco use disorder    Acute respiratory failure with hypoxemia    Pneumothorax, left    Chronic atrial fibrillation    Hypokalemia    Paroxysmal atrial fibrillation    Weakness    Intractable pain    Sepsis due to urinary tract infection    Complicated UTI (urinary tract infection)    Acquired hydroureter    Acute blood loss anemia    PVD (peripheral vascular disease)    Hydronephrosis of right kidney       Past Surgical History:   Procedure Laterality Date    aortagram with runoff  03/16/2018    APPENDECTOMY  1965    CHOLECYSTECTOMY  1980    CYSTOSCOPY W/ RETROGRADES Right 5/28/2020    Procedure: CYSTOSCOPY, WITH RETROGRADE PYELOGRAM;  Surgeon: Mario Hernandez MD;  Location: [facility];  Service: Urology;  Laterality: Right;    CYSTOSCOPY W/ URETERAL STENT PLACEMENT Right 7/27/2018    Procedure: CYSTOSCOPY, WITH URETERAL STENT INSERTION;  Surgeon: Tremaine Victor MD;  Location: [facility];  Service: Urology;  Laterality: Right;  RAFAEL VIDEO CONFIRMED 7/10/DME    CYSTOSCOPY W/ URETERAL STENT PLACEMENT Right 11/16/2018    Procedure: CYSTOSCOPY, WITH URETERAL STENT INSERTION;  Surgeon: Tremaine Victor MD;  Location: [facility] OR;  Service: Urology;  Laterality: Right;  STYKER CONFIRMED 11/7/DME    CYSTOSCOPY W/ URETERAL STENT PLACEMENT Right 5/24/2019    Procedure: CYSTOSCOPY, WITH URETERAL STENT INSERTION;  Surgeon: Tremaine Victor MD;  Location: [facility] OR;  Service: Urology;  Laterality: Right;  rafael video confirmed 5/17/dme     CYSTOSCOPY W/ URETERAL STENT PLACEMENT Right 9/6/2019    Procedure: CYSTOSCOPY, WITH URETERAL STENT INSERTION;  Surgeon: Tremaine Victor MD;  Location: [facility] OR;  Service: Urology;  Laterality: Right;    CYSTOSCOPY W/ URETERAL STENT PLACEMENT Right 12/6/2019    Procedure: CYSTOSCOPY, WITH URETERAL STENT INSERTION;  Surgeon: Tremaine Victor MD;  Location: [facility];  Service: Urology;  Laterality: Right;  NORI VIDEO CONFIRMED DME    CYSTOSCOPY W/ URETERAL STENT PLACEMENT Right 5/28/2020    Procedure: CYSTOSCOPY, WITH URETERAL STENT INSERTION;  Surgeon: Mario Hernandez MD;  Location: [facility];  Service: Urology;  Laterality: Right;    CYSTOSCOPY W/ URETERAL STENT PLACEMENT Right 11/5/2020    Procedure: CYSTOSCOPY, WITH URETERAL STENT INSERTION;  Surgeon: Mario Hernandez MD;  Location: [facility];  Service: Urology;  Laterality: Right;    CYSTOSCOPY W/ URETERAL STENT PLACEMENT Right 2/25/2021    Procedure: CYSTOSCOPY, WITH URETERAL STENT CHANGE;  Surgeon: Mario Hernandez MD;  Location: [facility];  Service: Urology;  Laterality: Right;    CYSTOSCOPY W/ URETERAL STENT PLACEMENT Right 6/10/2021    Procedure: CYSTOSCOPY, WITH URETERAL STENT CHANGE;  Surgeon: Mario Hernandez MD;  Location: [facility];  Service: Urology;  Laterality: Right;    CYSTOSCOPY W/ URETERAL STENT PLACEMENT Right 10/7/2021    Procedure: CYSTOSCOPY, WITH URETERAL STENT INSERTION;  Surgeon: Mario Hernandez MD;  Location: [facility];  Service: Urology;  Laterality: Right;    CYSTOSCOPY W/ URETERAL STENT PLACEMENT N/A 1/27/2022    Procedure: CYSTOSCOPY, WITH URETERAL STENT INSERTION;  Surgeon: Mario Hernandez MD;  Location: [facility];  Service: Urology;  Laterality: N/A;    CYSTOSCOPY W/ URETERAL STENT REMOVAL Right 11/16/2018    Procedure: CYSTOSCOPY, WITH URETERAL STENT REMOVAL;  Surgeon: Tremaine Victor MD;  Location: [facility];  Service: Urology;  Laterality: Right;    CYSTOSCOPY W/ URETERAL STENT REMOVAL Right 5/24/2019     Procedure: CYSTOSCOPY, WITH URETERAL STENT REMOVAL;  Surgeon: Tremaine Victor MD;  Location: [facility] OR;  Service: Urology;  Laterality: Right;    CYSTOSCOPY W/ URETERAL STENT REMOVAL Right 9/6/2019    Procedure: CYSTOSCOPY, WITH URETERAL STENT REMOVAL;  Surgeon: Tremaine Victor MD;  Location: [facility] OR;  Service: Urology;  Laterality: Right;    CYSTOSCOPY W/ URETERAL STENT REMOVAL Right 12/6/2019    Procedure: CYSTOSCOPY, WITH URETERAL STENT REMOVAL;  Surgeon: Tremaine Victor MD;  Location: [facility] OR;  Service: Urology;  Laterality: Right;    CYSTOSCOPY W/ URETERAL STENT REMOVAL Right 5/28/2020    Procedure: CYSTOSCOPY, WITH URETERAL STENT REMOVAL;  Surgeon: Mario Hernandez MD;  Location: [facility] OR;  Service: Urology;  Laterality: Right;    CYSTOSCOPY W/ URETERAL STENT REMOVAL Right 11/5/2020    Procedure: CYSTOSCOPY, WITH URETERAL STENT REMOVAL;  Surgeon: Mario Hernandez MD;  Location: [facility] OR;  Service: Urology;  Laterality: Right;    CYSTOSCOPY W/ URETERAL STENT REMOVAL Right 1/27/2022    Procedure: CYSTOSCOPY, WITH URETERAL STENT REMOVAL;  Surgeon: Mario Hernandez MD;  Location: [facility];  Service: Urology;  Laterality: Right;    cystoscopy with stent      HYSTERECTOMY  1965        Tobacco Use:  The patient  reports that she quit smoking about 3 years ago. Her smoking use included cigarettes. She has a 50.00 pack-year smoking history. She has never used smokeless tobacco.     Results for orders placed or performed during the hospital encounter of 04/25/22   EKG 12-lead    Collection Time: 04/25/22 12:58 PM    Narrative    Test Reason : Z01.818,    Vent. Rate : 064 BPM     Atrial Rate : 064 BPM     P-R Int : 138 ms          QRS Dur : 070 ms      QT Int : 444 ms       P-R-T Axes : 000 -30 036 degrees     QTc Int : 458 ms    Normal sinus rhythm  Left axis deviation  Nonspecific ST and T wave abnormality  Abnormal ECG  When compared with ECG of 04-OCT-2021 12:34,  Nonspecific T wave abnormality now  evident in Inferior leads  Nonspecific T wave abnormality now evident in Anterior leads    Referred By: AMANUEL ROSAS           Confirmed By:              Lab Results   Component Value Date    WBC 5.90 04/25/2022    WBC 5.90 04/25/2022    HGB 11.4 (L) 04/25/2022    HGB 11.4 (L) 04/25/2022    HCT 36.2 (L) 04/25/2022    HCT 36.2 (L) 04/25/2022     (H) 04/25/2022     (H) 04/25/2022     04/25/2022     04/25/2022     BMP  Lab Results   Component Value Date     04/25/2022     04/25/2022    K 4.0 04/25/2022    K 4.0 04/25/2022     (H) 04/25/2022     (H) 04/25/2022    CO2 21 (L) 04/25/2022    CO2 21 (L) 04/25/2022    BUN 32 (H) 04/25/2022    BUN 32 (H) 04/25/2022    CREATININE 1.5 (H) 04/25/2022    CREATININE 1.5 (H) 04/25/2022    CALCIUM 9.7 04/25/2022    CALCIUM 9.7 04/25/2022    ANIONGAP 8 04/25/2022    ANIONGAP 8 04/25/2022    ESTGFRAFRICA 36.9 (A) 04/25/2022    ESTGFRAFRICA 36.9 (A) 04/25/2022    EGFRNONAA 32.0 (A) 04/25/2022    EGFRNONAA 32.0 (A) 04/25/2022             Pre-op Assessment    I have reviewed the Patient Summary Reports.    I have reviewed the Nursing Notes. I have reviewed the NPO Status.   I have reviewed the Medications.     Review of Systems  Anesthesia Hx:  Hx of Anesthetic complications (h/o disorientation once)  Denies Family Hx of Anesthesia complications.  Personal Hx of Anesthesia complications, Post-Operative Nausea/Vomiting, in the past, but not with recent anesthetics / prophylaxis   Social:  Former Smoker    Hematology/Oncology:  Hematology Normal   Oncology Normal     EENT/Dental:   Glaucoma and macular degeneration; can't see in one eye and receives retinal injections in her other eye      Cardiovascular:   Hypertension Dysrhythmias (hx of afib) atrial fibrillation PVD hyperlipidemia ECG has been reviewed.  Functional Capacity unable to determine   Denies Congenital Heart Disease.    Denies Congenital Heart Disease.   Denies Deep  Venous Thrombosis (DVT)  Hypertension    Pulmonary:   COPD, moderate  Chronic Obstructive Pulmonary Disease (COPD):    Renal/:   Chronic Renal Disease, CRI renal calculi Hx of prolapse bladder Kidney Function/Disease, Chronic Kidney Disease (CKD)    Hepatic/GI:  Denies Liver Disease    Musculoskeletal:  Musculoskeletal Normal  Denies Rheumatic Disease    Neurological:   CVA, no residual symptoms  Denies Dx of Headaches Denies Seizure Disorder  CVA - Cerebrovasular Accident    Endocrine:  Endocrine Normal  Denies Diabetes    Psych:  Psychiatric Normal           Physical Exam  General:  Well nourished      Airway/Jaw/Neck:  Airway Findings: Mouth Opening: Normal   General Airway Assessment: Adult Mallampati: II  TM Distance: Normal, at least 6 cm       Dental:  Dental Findings: Edentulous     Chest/Lungs:  Chest/Lungs Findings: Clear to auscultation      Heart/Vascular:  Heart Findings: Rhythm: Occasional Prematures        Mental Status:  Mental Status Findings:  Cooperative, Alert and Oriented         Anesthesia Plan  Type of Anesthesia, risks & benefits discussed:  Anesthesia Type:  general    Patient's Preference:   Plan Factors:          Intra-op Monitoring Plan: standard ASA monitors  Intra-op Monitoring Plan Comments:   Post Op Pain Control Plan: multimodal analgesia  Post Op Pain Control Plan Comments:     Induction:   IV  Beta Blocker:  Patient is not currently on a Beta-Blocker (No further documentation required).       Informed Consent: Informed consent signed with the Patient and all parties understand the risks and agree with anesthesia plan.  All questions answered.  Anesthesia consent signed with patient.  ASA Score: 4     Day of Surgery Review of History & Physical: I have interviewed and examined the patient. I have reviewed the patient's H&P dated:        Anesthesia Plan Notes: General LMA OK; patient with significant confusion following recent anesthesia  No Versed and Minimal fentanyl  Zofran,  Pepcid        Ready For Surgery From Anesthesia Perspective.           Physical Exam  General: Well nourished    Airway:  Mallampati: II   Mouth Opening: Normal  TM Distance: Normal, at least 6 cm      Dental:  Edentulous    Chest/Lungs:  Clear to auscultation    Heart:  Rhythm: Occasional Prematures          Anesthesia Plan  Type of Anesthesia, risks & benefits discussed:    Anesthesia Type: general  Intra-op Monitoring Plan: standard ASA monitors  Post Op Pain Control Plan: multimodal analgesia  Induction:  IV  Informed Consent: Informed consent signed with the Patient and all parties understand the risks and agree with anesthesia plan.  All questions answered.   ASA Score: 4  Day of Surgery Review of History & Physical: I have interviewed and examined the patient. I have reviewed the patient's H&P dated:   Anesthesia Plan Notes: General LMA OK; patient with significant confusion following recent anesthesia  No Versed and Minimal fentanyl  Zofran, Pepcid    Ready For Surgery From Anesthesia Perspective.       .

## 2022-04-28 NOTE — TRANSFER OF CARE
Anesthesia Transfer of Care Note    Patient: Claudia Lerner    Procedure(s) Performed: Procedure(s) (LRB):  CYSTOSCOPY, WITH URETERAL STENT CHANGE (N/A)    Patient location: PACU    Anesthesia Type: general    Transport from OR: Transported from OR on room air with adequate spontaneous ventilation    Post pain: adequate analgesia    Post assessment: no apparent anesthetic complications and tolerated procedure well    Post vital signs: stable    Level of consciousness: awake and alert    Nausea/Vomiting: no nausea/vomiting    Complications: none    Transfer of care protocol was followed      Last vitals:   Visit Vitals  /64   Pulse 67   Temp 36.6 °C (97.8 °F) (Oral)   Resp 17   Ht 5' (1.524 m)   Wt 55.3 kg (122 lb)   LMP  (LMP Unknown)   SpO2 99%   Breastfeeding No   BMI 23.83 kg/m²

## 2022-06-22 ENCOUNTER — TELEPHONE (OUTPATIENT)
Dept: CARDIOLOGY | Facility: CLINIC | Age: 82
End: 2022-06-22

## 2022-06-22 ENCOUNTER — OFFICE VISIT (OUTPATIENT)
Dept: CARDIOLOGY | Facility: CLINIC | Age: 82
End: 2022-06-22
Payer: MEDICARE

## 2022-06-22 VITALS
HEART RATE: 69 BPM | BODY MASS INDEX: 23.83 KG/M2 | OXYGEN SATURATION: 96 % | SYSTOLIC BLOOD PRESSURE: 125 MMHG | HEIGHT: 60 IN | DIASTOLIC BLOOD PRESSURE: 62 MMHG

## 2022-06-22 DIAGNOSIS — I48.0 PAROXYSMAL ATRIAL FIBRILLATION: ICD-10-CM

## 2022-06-22 DIAGNOSIS — I73.9 PVD (PERIPHERAL VASCULAR DISEASE): ICD-10-CM

## 2022-06-22 DIAGNOSIS — F17.200 TOBACCO USE DISORDER: ICD-10-CM

## 2022-06-22 DIAGNOSIS — I71.40 ABDOMINAL AORTIC ANEURYSM (AAA) WITHOUT RUPTURE: ICD-10-CM

## 2022-06-22 DIAGNOSIS — E78.2 MIXED HYPERLIPIDEMIA: ICD-10-CM

## 2022-06-22 DIAGNOSIS — I10 ESSENTIAL HYPERTENSION: Primary | ICD-10-CM

## 2022-06-22 DIAGNOSIS — N28.9 RENAL INSUFFICIENCY: ICD-10-CM

## 2022-06-22 DIAGNOSIS — C50.412 MALIGNANT NEOPLASM OF UPPER-OUTER QUADRANT OF LEFT FEMALE BREAST, UNSPECIFIED ESTROGEN RECEPTOR STATUS: ICD-10-CM

## 2022-06-22 PROCEDURE — 99999 PR PBB SHADOW E&M-EST. PATIENT-LVL III: CPT | Mod: PBBFAC,,, | Performed by: INTERNAL MEDICINE

## 2022-06-22 PROCEDURE — 99205 OFFICE O/P NEW HI 60 MIN: CPT | Mod: 25,S$PBB,, | Performed by: INTERNAL MEDICINE

## 2022-06-22 PROCEDURE — 93010 EKG 12-LEAD: ICD-10-PCS | Mod: S$PBB,,, | Performed by: INTERNAL MEDICINE

## 2022-06-22 PROCEDURE — 99999 PR PBB SHADOW E&M-EST. PATIENT-LVL III: ICD-10-PCS | Mod: PBBFAC,,, | Performed by: INTERNAL MEDICINE

## 2022-06-22 PROCEDURE — 99205 PR OFFICE/OUTPT VISIT, NEW, LEVL V, 60-74 MIN: ICD-10-PCS | Mod: 25,S$PBB,, | Performed by: INTERNAL MEDICINE

## 2022-06-22 PROCEDURE — 93005 ELECTROCARDIOGRAM TRACING: CPT | Mod: PBBFAC,PN | Performed by: INTERNAL MEDICINE

## 2022-06-22 PROCEDURE — 93010 ELECTROCARDIOGRAM REPORT: CPT | Mod: S$PBB,,, | Performed by: INTERNAL MEDICINE

## 2022-06-22 PROCEDURE — 99213 OFFICE O/P EST LOW 20 MIN: CPT | Mod: PBBFAC,PN | Performed by: INTERNAL MEDICINE

## 2022-06-22 NOTE — PROGRESS NOTES
"  Subjective:      Patient ID: Claudia Lerner is a 81 y.o. female.    Chief Complaint: Hypertension, Atrial Fibrillation, Peripheral Vascular Disease, and Establish Care (Dr Ross)    HPI:  Pt used to see Dr Ross.    Daughter reports pt had one brief episode of atrial fibrillation associated with sepsis.    Pt has inoperable breast cancer on left.    Pt has "inoperable" aortic aneurysm.  Pt saw 2 vascular surgeons.  Both told pt to continue medications for her aneurysms.    Pt walks around the house with a walker.    Pt is blind in her right eye and only sees shadows with the left eye    Pt goes to PT.    Pt fell once with fx right clavicle.    Pt gets ureteral stent replaced periodically.      Review of Systems   Cardiovascular: Negative for chest pain, claudication, dyspnea on exertion, irregular heartbeat, leg swelling, near-syncope, orthopnea, palpitations and syncope.        Past Medical History:   Diagnosis Date    A-fib 2017    history    AAA (abdominal aortic aneurysm) 2017    5.2 cm-states not operable    Acquired hydroureter 2018    Adenocarcinoma 2017    left breast cancer , metastatic no chemo or surgery    Anemia     Aneurysm of thoracic aorta 03/16/2018    Carotid stenosis 2018    Cataract     Clotting disorder     h/o pulmonary emboli    COPD (chronic obstructive pulmonary disease) 2017    emphysema    Diarrhea     Fatigue     Glaucoma 2015    History of prolapse of bladder 2017    Hydronephrosis 2017    Hydropneumothorax 2017    LEFT    Hyperlipidemia 2019    Hypertension 2017    ovarian mass was removed    Hypokalemia     Hypotension     Kidney mass 2017    Macular degeneration 2011    PAD (peripheral artery disease)     PVD (peripheral vascular disease) 2018    Renal insufficiency 2017    Sepsis due to urinary tract infection 2017    Stroke 2017    no resisdual-daughter states reccents tests show no evidence of stroke    Ureteral obstruction, right 2017    Urinary " incontinence 2005    UTI (urinary tract infection)         Past Surgical History:   Procedure Laterality Date    aortagram with runoff  03/16/2018    APPENDECTOMY  1965    CHOLECYSTECTOMY  1980    CYSTOSCOPY W/ RETROGRADES Right 5/28/2020    Procedure: CYSTOSCOPY, WITH RETROGRADE PYELOGRAM;  Surgeon: Mario Hernandez MD;  Location: [facility];  Service: Urology;  Laterality: Right;    CYSTOSCOPY W/ URETERAL STENT PLACEMENT Right 7/27/2018    Procedure: CYSTOSCOPY, WITH URETERAL STENT INSERTION;  Surgeon: Tremaine Victor MD;  Location: [facility];  Service: Urology;  Laterality: Right;  NORI VIDEO CONFIRMED 7/10/DME    CYSTOSCOPY W/ URETERAL STENT PLACEMENT Right 11/16/2018    Procedure: CYSTOSCOPY, WITH URETERAL STENT INSERTION;  Surgeon: Tremaine Victor MD;  Location: [facility];  Service: Urology;  Laterality: Right;  STYKER CONFIRMED 11/7/DME    CYSTOSCOPY W/ URETERAL STENT PLACEMENT Right 5/24/2019    Procedure: CYSTOSCOPY, WITH URETERAL STENT INSERTION;  Surgeon: Tremaine Victor MD;  Location: [facility];  Service: Urology;  Laterality: Right;  nori video confirmed 5/17/dme    CYSTOSCOPY W/ URETERAL STENT PLACEMENT Right 9/6/2019    Procedure: CYSTOSCOPY, WITH URETERAL STENT INSERTION;  Surgeon: Tremaine Victor MD;  Location: [facility];  Service: Urology;  Laterality: Right;    CYSTOSCOPY W/ URETERAL STENT PLACEMENT Right 12/6/2019    Procedure: CYSTOSCOPY, WITH URETERAL STENT INSERTION;  Surgeon: Tremaine Victor MD;  Location: [facility];  Service: Urology;  Laterality: Right;  NORI VIDEO CONFIRMED DME    CYSTOSCOPY W/ URETERAL STENT PLACEMENT Right 5/28/2020    Procedure: CYSTOSCOPY, WITH URETERAL STENT INSERTION;  Surgeon: Mario Hernandez MD;  Location: [facility];  Service: Urology;  Laterality: Right;    CYSTOSCOPY W/ URETERAL STENT PLACEMENT Right 11/5/2020    Procedure: CYSTOSCOPY, WITH URETERAL STENT INSERTION;  Surgeon: Mario Hernandez MD;  Location: [facility];  Service: Urology;   Laterality: Right;    CYSTOSCOPY W/ URETERAL STENT PLACEMENT Right 2/25/2021    Procedure: CYSTOSCOPY, WITH URETERAL STENT CHANGE;  Surgeon: Mario Hernandez MD;  Location: [facility];  Service: Urology;  Laterality: Right;    CYSTOSCOPY W/ URETERAL STENT PLACEMENT Right 6/10/2021    Procedure: CYSTOSCOPY, WITH URETERAL STENT CHANGE;  Surgeon: Mario Hernandez MD;  Location: [facility];  Service: Urology;  Laterality: Right;    CYSTOSCOPY W/ URETERAL STENT PLACEMENT Right 10/7/2021    Procedure: CYSTOSCOPY, WITH URETERAL STENT INSERTION;  Surgeon: Mario Hernandez MD;  Location: [facility] OR;  Service: Urology;  Laterality: Right;    CYSTOSCOPY W/ URETERAL STENT PLACEMENT N/A 1/27/2022    Procedure: CYSTOSCOPY, WITH URETERAL STENT INSERTION;  Surgeon: Mario Hernandez MD;  Location: [facility];  Service: Urology;  Laterality: N/A;    CYSTOSCOPY W/ URETERAL STENT PLACEMENT N/A 4/28/2022    Procedure: CYSTOSCOPY, WITH URETERAL STENT INSERTION;  Surgeon: Mario Hernandez MD;  Location: [facility];  Service: Urology;  Laterality: N/A;    CYSTOSCOPY W/ URETERAL STENT REMOVAL Right 11/16/2018    Procedure: CYSTOSCOPY, WITH URETERAL STENT REMOVAL;  Surgeon: Tremaine Victor MD;  Location: [facility];  Service: Urology;  Laterality: Right;    CYSTOSCOPY W/ URETERAL STENT REMOVAL Right 5/24/2019    Procedure: CYSTOSCOPY, WITH URETERAL STENT REMOVAL;  Surgeon: Tremaine Victor MD;  Location: [facility];  Service: Urology;  Laterality: Right;    CYSTOSCOPY W/ URETERAL STENT REMOVAL Right 9/6/2019    Procedure: CYSTOSCOPY, WITH URETERAL STENT REMOVAL;  Surgeon: Tremaine Victor MD;  Location: [facility];  Service: Urology;  Laterality: Right;    CYSTOSCOPY W/ URETERAL STENT REMOVAL Right 12/6/2019    Procedure: CYSTOSCOPY, WITH URETERAL STENT REMOVAL;  Surgeon: Tremaine Victor MD;  Location: [facility];  Service: Urology;  Laterality: Right;    CYSTOSCOPY W/ URETERAL STENT REMOVAL Right 5/28/2020    Procedure: CYSTOSCOPY, WITH  URETERAL STENT REMOVAL;  Surgeon: Mario Hernandez MD;  Location: [facility] OR;  Service: Urology;  Laterality: Right;    CYSTOSCOPY W/ URETERAL STENT REMOVAL Right 11/5/2020    Procedure: CYSTOSCOPY, WITH URETERAL STENT REMOVAL;  Surgeon: Mario Hernandez MD;  Location: [facility] OR;  Service: Urology;  Laterality: Right;    CYSTOSCOPY W/ URETERAL STENT REMOVAL Right 1/27/2022    Procedure: CYSTOSCOPY, WITH URETERAL STENT REMOVAL;  Surgeon: Mario Hernandez MD;  Location: [facility] OR;  Service: Urology;  Laterality: Right;    CYSTOSCOPY W/ URETERAL STENT REMOVAL N/A 4/28/2022    Procedure: CYSTOSCOPY, WITH URETERAL STENT REMOVAL;  Surgeon: Mario Hernandez MD;  Location: [facility] OR;  Service: Urology;  Laterality: N/A;    cystoscopy with stent      HYSTERECTOMY  1965       Family History   Problem Relation Age of Onset    Heart disease Mother     Heart disease Father     Cancer Father     Diabetes Father     Heart disease Sister     Aneurysm Son        Social History     Socioeconomic History    Marital status:    Tobacco Use    Smoking status: Former Smoker     Packs/day: 1.00     Years: 50.00     Pack years: 50.00     Types: Cigarettes     Quit date: 1/1/2019     Years since quitting: 3.4    Smokeless tobacco: Never Used   Substance and Sexual Activity    Alcohol use: No    Drug use: No    Sexual activity: Not Currently       Current Outpatient Medications on File Prior to Visit   Medication Sig Dispense Refill    aspirin (ECOTRIN) 81 MG EC tablet Take 81 mg by mouth once daily.      clopidogrel (PLAVIX) 75 mg tablet Take 75 mg by mouth once daily.      dronedarone (MULTAQ) 400 mg Tab Take 1 tablet (400 mg total) by mouth 2 (two) times daily. 60 tablet 11    letrozole (FEMARA) 2.5 mg Tab TAKE 1 TABLET(2.5 MG) BY MOUTH  Daily (Patient taking differently: 2.5 mg once daily TAKE 1 TABLET(2.5 MG) BY MOUTH  Daily.) 30 tablet 6    MULTIVIT WITH MINERALS/LUTEIN (MULTIVITAMIN 50 PLUS ORAL) Take 1  tablet by mouth every evening.       oxybutynin (DITROPAN) 5 MG Tab Take 1 tablet (5 mg total) by mouth once daily. (Patient taking differently: Take 5 mg by mouth daily as needed.) 30 tablet 11    pravastatin (PRAVACHOL) 40 MG tablet Take 1 tablet (40 mg total) by mouth once daily. 90 tablet 3    timolol maleate 0.25% (TIMOPTIC) 0.25 % Drop Place 1 drop into the left eye 2 (two) times daily.       tramadol-acetaminophen 37.5-325 mg (ULTRACET) 37.5-325 mg Tab Take 1 tablet by mouth every 6 (six) hours as needed for Pain. 12 tablet 0    [DISCONTINUED] ALPRAZolam (XANAX) 0.25 MG tablet Take 1 tablet (0.25 mg total) by mouth daily as needed for Anxiety. 30 tablet 1     Current Facility-Administered Medications on File Prior to Visit   Medication Dose Route Frequency Provider Last Rate Last Admin    lactated ringers infusion   Intravenous Continuous Tremaine Victor MD 75 mL/hr at 05/24/19 0730 New Bag at 05/24/19 0730    lactated ringers infusion   Intravenous Continuous Tremaine Victor MD   New Bag at 12/06/19 0715       Review of patient's allergies indicates:   Allergen Reactions    Sulfa (sulfonamide antibiotics) Other (See Comments)     unknown    Codeine Palpitations     Objective:     Vitals:    06/22/22 1113 06/22/22 1144   BP: (!) 163/69 125/62   BP Location: Left arm Left arm   Patient Position: Sitting Sitting   BP Method: Large (Automatic)    Pulse: 69    SpO2: 96%    Weight: Comment: wheelchair    Height: 5' (1.524 m)         Physical Exam  Vitals reviewed.   Constitutional:       Appearance: She is well-developed.   Eyes:      General: No scleral icterus.  Neck:      Vascular: Carotid bruit (left) present. No JVD.   Cardiovascular:      Rate and Rhythm: Normal rate and regular rhythm.      Heart sounds: No murmur heard.    No gallop.   Pulmonary:      Breath sounds: Normal breath sounds.   Abdominal:      Palpations: There is pulsatile mass.   Skin:     General: Skin is warm and dry.    Neurological:      Mental Status: She is alert and oriented to person, place, and time.   Psychiatric:         Behavior: Behavior normal.         Thought Content: Thought content normal.         Judgment: Judgment normal.        ECG today reviewed by me: NSR with PAC's, leftward axis, low T waves    Lab Visit on 04/25/2022   Component Date Value Ref Range Status    Specimen UA 04/25/2022 Urine, Clean Catch   Final    Color, UA 04/25/2022 Yellow  Yellow, Straw, Rosi Final    Appearance, UA 04/25/2022 Hazy (A) Clear Final    pH, UA 04/25/2022 6.0  5.0 - 8.0 Final    Specific Gravity, UA 04/25/2022 1.020  1.005 - 1.030 Final    Protein, UA 04/25/2022 3+ (A) Negative Final    Glucose, UA 04/25/2022 Negative  Negative Final    Ketones, UA 04/25/2022 Negative  Negative Final    Bilirubin (UA) 04/25/2022 Negative  Negative Final    Occult Blood UA 04/25/2022 2+ (A) Negative Final    Nitrite, UA 04/25/2022 Positive (A) Negative Final    Urobilinogen, UA 04/25/2022 Negative  Negative EU/dL Final    Leukocytes, UA 04/25/2022 3+ (A) Negative Final    RBC, UA 04/25/2022 14 (A) 0 - 4 /hpf Final    WBC, UA 04/25/2022 >100 (A) 0 - 5 /hpf Final    Bacteria 04/25/2022 Moderate (A) None-Occ /hpf Final    Squam Epithel, UA 04/25/2022 5  /hpf Final    Hyaline Casts, UA 04/25/2022 18 (A) 0-1/lpf /lpf Final    Microscopic Comment 04/25/2022 SEE COMMENT   Final   Lab Visit on 04/25/2022   Component Date Value Ref Range Status    Hemoglobin A1C 04/25/2022 5.3  4.5 - 6.2 % Final    Estimated Avg Glucose 04/25/2022 105  68 - 131 mg/dL Final    Cholesterol 04/25/2022 189  120 - 199 mg/dL Final    Triglycerides 04/25/2022 196 (A) 30 - 150 mg/dL Final    HDL 04/25/2022 57  40 - 75 mg/dL Final    LDL Cholesterol 04/25/2022 92.8  63.0 - 159.0 mg/dL Final    HDL/Cholesterol Ratio 04/25/2022 30.2  20.0 - 50.0 % Final    Total Cholesterol/HDL Ratio 04/25/2022 3.3  2.0 - 5.0 Final    Non-HDL Cholesterol 04/25/2022 132   mg/dL Final    TSH 04/25/2022 3.570  0.340 - 5.600 uIU/mL Final   Hospital Outpatient Visit on 04/25/2022   Component Date Value Ref Range Status    aPTT 04/25/2022 29.6  23.3 - 35.1 sec Final    PT 04/25/2022 14.5 (A) 11.4 - 13.7 sec Final    INR 04/25/2022 1.2   Final    Sodium 04/25/2022 144  136 - 145 mmol/L Final    Potassium 04/25/2022 4.0  3.5 - 5.1 mmol/L Final    Chloride 04/25/2022 115 (A) 95 - 110 mmol/L Final    CO2 04/25/2022 21 (A) 23 - 29 mmol/L Final    Glucose 04/25/2022 105  70 - 110 mg/dL Final    BUN 04/25/2022 32 (A) 8 - 23 mg/dL Final    Creatinine 04/25/2022 1.5 (A) 0.5 - 1.4 mg/dL Final    Calcium 04/25/2022 9.7  8.7 - 10.5 mg/dL Final    Total Protein 04/25/2022 7.3  6.0 - 8.4 g/dL Final    Albumin 04/25/2022 3.7  3.5 - 5.2 g/dL Final    Total Bilirubin 04/25/2022 0.7  0.1 - 1.0 mg/dL Final    Alkaline Phosphatase 04/25/2022 51 (A) 55 - 135 U/L Final    AST 04/25/2022 19  10 - 40 U/L Final    ALT 04/25/2022 12  10 - 44 U/L Final    Anion Gap 04/25/2022 8  8 - 16 mmol/L Final    eGFR if  04/25/2022 36.9 (A) >60 mL/min/1.73 m^2 Final    eGFR if non African American 04/25/2022 32.0 (A) >60 mL/min/1.73 m^2 Final    WBC 04/25/2022 5.90  3.90 - 12.70 K/uL Final    RBC 04/25/2022 3.63 (A) 4.00 - 5.40 M/uL Final    Hemoglobin 04/25/2022 11.4 (A) 12.0 - 16.0 g/dL Final    Hematocrit 04/25/2022 36.2 (A) 37.0 - 48.5 % Final    MCV 04/25/2022 100 (A) 82 - 98 fL Final    MCH 04/25/2022 31.4 (A) 27.0 - 31.0 pg Final    MCHC 04/25/2022 31.5 (A) 32.0 - 36.0 g/dL Final    RDW 04/25/2022 13.7  11.5 - 14.5 % Final    Platelets 04/25/2022 158  150 - 450 K/uL Final    MPV 04/25/2022 10.3  9.2 - 12.9 fL Final    Immature Granulocytes 04/25/2022 0.3  0.0 - 0.5 % Final    Gran # (ANC) 04/25/2022 3.6  1.8 - 7.7 K/uL Final    Immature Grans (Abs) 04/25/2022 0.02  0.00 - 0.04 K/uL Final    Lymph # 04/25/2022 1.6  1.0 - 4.8 K/uL Final    Mono # 04/25/2022 0.4  0.3 -  1.0 K/uL Final    Eos # 04/25/2022 0.2  0.0 - 0.5 K/uL Final    Baso # 04/25/2022 0.03  0.00 - 0.20 K/uL Final    nRBC 04/25/2022 0  0 /100 WBC Final    Gran % 04/25/2022 60.7  38.0 - 73.0 % Final    Lymph % 04/25/2022 27.6  18.0 - 48.0 % Final    Mono % 04/25/2022 7.3  4.0 - 15.0 % Final    Eosinophil % 04/25/2022 3.6  0.0 - 8.0 % Final    Basophil % 04/25/2022 0.5  0.0 - 1.9 % Final    Differential Method 04/25/2022 Automated   Final    PT 04/25/2022 14.5 (A) 11.4 - 13.7 sec Final    INR 04/25/2022 1.2   Final    aPTT 04/25/2022 29.6  23.3 - 35.1 sec Final    Sodium 04/25/2022 144  136 - 145 mmol/L Final    Potassium 04/25/2022 4.0  3.5 - 5.1 mmol/L Final    Chloride 04/25/2022 115 (A) 95 - 110 mmol/L Final    CO2 04/25/2022 21 (A) 23 - 29 mmol/L Final    Glucose 04/25/2022 105  70 - 110 mg/dL Final    BUN 04/25/2022 32 (A) 8 - 23 mg/dL Final    Creatinine 04/25/2022 1.5 (A) 0.5 - 1.4 mg/dL Final    Calcium 04/25/2022 9.7  8.7 - 10.5 mg/dL Final    Total Protein 04/25/2022 7.3  6.0 - 8.4 g/dL Final    Albumin 04/25/2022 3.7  3.5 - 5.2 g/dL Final    Total Bilirubin 04/25/2022 0.7  0.1 - 1.0 mg/dL Final    Alkaline Phosphatase 04/25/2022 51 (A) 55 - 135 U/L Final    AST 04/25/2022 19  10 - 40 U/L Final    ALT 04/25/2022 12  10 - 44 U/L Final    Anion Gap 04/25/2022 8  8 - 16 mmol/L Final    eGFR if  04/25/2022 36.9 (A) >60 mL/min/1.73 m^2 Final    eGFR if non African American 04/25/2022 32.0 (A) >60 mL/min/1.73 m^2 Final    WBC 04/25/2022 5.90  3.90 - 12.70 K/uL Final    RBC 04/25/2022 3.63 (A) 4.00 - 5.40 M/uL Final    Hemoglobin 04/25/2022 11.4 (A) 12.0 - 16.0 g/dL Final    Hematocrit 04/25/2022 36.2 (A) 37.0 - 48.5 % Final    MCV 04/25/2022 100 (A) 82 - 98 fL Final    MCH 04/25/2022 31.4 (A) 27.0 - 31.0 pg Final    MCHC 04/25/2022 31.5 (A) 32.0 - 36.0 g/dL Final    RDW 04/25/2022 13.7  11.5 - 14.5 % Final    Platelets 04/25/2022 158  150 - 450 K/uL Final     MPV 04/25/2022 10.3  9.2 - 12.9 fL Final   Hospital Outpatient Visit on 01/25/2022   Component Date Value Ref Range Status    SARS-CoV-2 RNA, Amplification, Qual 01/25/2022 Negative  Negative Final    Sodium 01/25/2022 141  136 - 145 mmol/L Final    Potassium 01/25/2022 4.8  3.5 - 5.1 mmol/L Final    Chloride 01/25/2022 110  95 - 110 mmol/L Final    CO2 01/25/2022 21 (A) 23 - 29 mmol/L Final    Glucose 01/25/2022 94  70 - 110 mg/dL Final    BUN 01/25/2022 35 (A) 8 - 23 mg/dL Final    Creatinine 01/25/2022 1.3  0.5 - 1.4 mg/dL Final    Calcium 01/25/2022 9.3  8.7 - 10.5 mg/dL Final    Total Protein 01/25/2022 7.3  6.0 - 8.4 g/dL Final    Albumin 01/25/2022 3.8  3.5 - 5.2 g/dL Final    Total Bilirubin 01/25/2022 1.0  0.1 - 1.0 mg/dL Final    Alkaline Phosphatase 01/25/2022 52 (A) 55 - 135 U/L Final    AST 01/25/2022 20  10 - 40 U/L Final    ALT 01/25/2022 17  10 - 44 U/L Final    Anion Gap 01/25/2022 10  8 - 16 mmol/L Final    eGFR if  01/25/2022 43.8 (A) >60 mL/min/1.73 m^2 Final    eGFR if non  01/25/2022 38.0 (A) >60 mL/min/1.73 m^2 Final    Specimen UA 01/25/2022 Urine, Clean Catch   Final    Color, UA 01/25/2022 Yellow  Yellow, Straw, Rosi Final    Appearance, UA 01/25/2022 Hazy (A) Clear Final    pH, UA 01/25/2022 6.0  5.0 - 8.0 Final    Specific Gravity, UA 01/25/2022 1.015  1.005 - 1.030 Final    Protein, UA 01/25/2022 1+ (A) Negative Final    Glucose, UA 01/25/2022 Negative  Negative Final    Ketones, UA 01/25/2022 Negative  Negative Final    Bilirubin (UA) 01/25/2022 Negative  Negative Final    Occult Blood UA 01/25/2022 2+ (A) Negative Final    Nitrite, UA 01/25/2022 Negative  Negative Final    Urobilinogen, UA 01/25/2022 Negative  Negative EU/dL Final    Leukocytes, UA 01/25/2022 3+ (A) Negative Final    WBC 01/25/2022 6.22  3.90 - 12.70 K/uL Final    RBC 01/25/2022 3.55 (A) 4.00 - 5.40 M/uL Final    Hemoglobin 01/25/2022 11.3 (A) 12.0 -  16.0 g/dL Final    Hematocrit 01/25/2022 35.6 (A) 37.0 - 48.5 % Final    MCV 01/25/2022 100 (A) 82 - 98 fL Final    MCH 01/25/2022 31.8 (A) 27.0 - 31.0 pg Final    MCHC 01/25/2022 31.7 (A) 32.0 - 36.0 g/dL Final    RDW 01/25/2022 13.8  11.5 - 14.5 % Final    Platelets 01/25/2022 172  150 - 450 K/uL Final    MPV 01/25/2022 9.5  9.2 - 12.9 fL Final    PT 01/25/2022 13.8 (A) 11.4 - 13.7 sec Final    INR 01/25/2022 1.1   Final    aPTT 01/25/2022 28.4  23.3 - 35.1 sec Final    RBC, UA 01/25/2022 29 (A) 0 - 4 /hpf Final    WBC, UA 01/25/2022 >100 (A) 0 - 5 /hpf Final    Bacteria 01/25/2022 Many (A) None-Occ /hpf Final    Squam Epithel, UA 01/25/2022 2  /hpf Final    Hyaline Casts, UA 01/25/2022 12 (A) 0-1/lpf /lpf Final    Microscopic Comment 01/25/2022 SEE COMMENT   Final   (      Stress test 7/21/17:    This is a technically adequate study. Inspection of the transaxial images demonstrated no significant cranial, caudal, or lateral patient motion in the camera between rest and stress acquisitions. There is homogeneous uptake of radiotracer in all walls   of the myocardium on stress and rest images. The extracardiac distribution of radioactivity is normal. The left ventricular cavity is normal in size and does not increase with stress. On gated SPECT, left ventricular motion is normal at rest.     Impression: NORMAL MYOCARDIAL PERFUSION   1. The perfusion scan is free of evidence for myocardial ischemia or injury.   2. Resting wall motion is physiologic.   3. The ventricular volumes are normal at rest and stress.   4. The extracardiac distribution of radioactivity is normal.         EKG Conclusions:     1. The EKG portion of this study is negative for ischemia at a peak heart rate of 123 bpm (90% of predicted).   2. Blood pressure remained stable throughout the protocol  (Presenting BP: 95/59 Peak BP: 114/48).   3. The following arrhythmias were present: occasional PACs & PVCs.   4. There were no symptoms  of chest discomfort or significant dyspnea throughout the protocol.     Nuclear Procedure:   Following a single isotope protocol, Regadenoson pharmacologic stress testing was performed as described above. Immediately following the IV bolus of regadenoson,  mCi of Tc99 labeled   Accession #: [id number]    2D echo with color flow doppler  Order# [id number]  Ordering physician: Ian Ngo MD Study date: 07/24/2017       Reason for Exam  Priority: Routine  Arrhythmia   Dx: Cardiac arrhythmia [I49.9 (ICD-10-CM)]   Comments: Ask Dr. Fontanez to read      Contains abnormal data 2D echo with color flow doppler  Order: [id number]  · Status: Final result     · Visible to patient: Yes (seen)      · Next appt: None     · Dx: Cardiac arrhythmia      · 0 Result Notes      Component Ref Range & Units 4 yr ago   (7/21/17) 4 yr ago   (7/21/17)   EF + QEF 55 - 65 60     Mitral Valve Regurgitation  MODERATE Abnormal      Diastolic Dysfunction  No  No    Aortic Valve Regurgitation  MILD TO MODERATE Abnormal      Est. PA Systolic Pressure  43.7 Abnormal      Tricuspid Valve Regurgitation  MODERATE TO SEVERE Abnormal      Resulting Agency  [facility]               Narrative  Performed by: [facility]  Date of Procedure: 07/21/2017         TEST DESCRIPTION   Technical Quality: This is a technically adequate study.     Aorta: The aortic root is normal in size, measuring 3.3 cm at sinotubular junction.     Left Atrium: The left atrial volume index is normal, measuring 30.40 cc/m2.     Left Ventricle: The left ventricle is normal in size, with an end-diastolic diameter of 3.3 cm, and an end-systolic diameter of 2.3 cm. LV wall thickness is normal, with the septum measuring 1.0 cm and the posterior wall measuring 0.8 cm across. Relative    wall thickness was increased at 0.48, and the LV mass index was 62.1 g/m2 consistent with concentric remodeling. There are no regional wall motion abnormalities. Left ventricular systolic function appears normal.  Visually estimated ejection fraction is   60-65%.     Diastolic indices: E wave velocity 0.6 m/s, E/A ratio 1.3,  msec., E/e' ratio(avg) 6. Diastolic function is normal.     Right Atrium: The right atrium is normal in size, measuring 5.6 cm in length and 4.2 cm in width in the apical view.     Right Ventricle: The right ventricle is normal in size. Global right ventricular systolic function appears normal. The estimated PA systolic pressure is 44 mmHg.     Aortic Valve:  The peak gradient obtained across the aortic valve is 8 mmHg. Additionally, there is mild to moderate aortic regurgitation. There is a pressure half time of 467.0 msec.     Mitral Valve:  There is moderate mitral regurgitation.     Tricuspid Valve:  There is moderate to severe tricuspid regurgitation.     Pulmonary Valve:  The pulmonic valve is not well seen.     IVC: IVC is normal in size and collapses > 50% with a sniff, suggesting normal right atrial pressure of 3 mmHg.     Intracavitary: There is no evidence of pericardial effusion, intracavity mass, thrombi, or vegetation.         CONCLUSIONS     1 - No wall motion abnormalities.     2 - Normal left ventricular systolic function (EF 60-65%).     3 - Normal left ventricular diastolic function.     4 - Normal right ventricular systolic function .     5 - Pulmonary hypertension. The estimated PA systolic pressure is 44 mmHg.     6 - Mild to moderate aortic regurgitation.     7 - Moderate mitral regurgitation.     8 - Moderate to severe tricuspid regurgitation.              Accession #: [id number]    Claudia Lerner  Cardiac catheterization  Order# [id number]  Reading physician: Danish Ross MD Ordering physician: Danish Ross MD Study date: 3/16/18       Patient Information    Name MRN Description   Claudia Lerner [id number] 77 y.o. female     Physicians    Panel Physicians Referring Physician Case Authorizing Physician   Danish Ross MD (Primary)  Danish Ross MD      Indications    PVD (peripheral vascular disease) [I73.9 (ICD-10-CM)]   Hypertension, unspecified type [I10 (ICD-10-CM)]     Summary       · Estimated blood loss is 20 mL. No specimen was collected.  · Moderate to large size proximal abdominal aortic aneurysm with possible involvement of the distal thoracic aorta  · Moderate size distal abdominal aortic aneurysm at the iliac bifurcation  · Very tortuous and diffusely diseased abdominal aorta  · Total occlusion of the right ostial and proximal common iliac artery which then fills distally from collaterals of the internal iliac artery  · Totally occluded left proximal SFA which fills from collaterals from the left profunda femoris at Thang's canal  · Sluggish three-vessel runoff to the right foot and 2 vessel runoff to the left foot. 4 extremities a predominantly perfused by the right left anterior tibial artery        Procedure Log documented by Documenter: Chuck Frazier RN and verified by Danish Ross.        Date: 3/16/2018  Time: 9:55 AM     Angiography procedure  1.  Abdominal aortogram  2.  Descending thoracic aortogram  3.  Bilateral iliac arterial angiography  4.  Bilateral lower extremity angiography and runoff     Description of procedure:  Patient brought to the catheterization laboratory placed in the supine position on the catheterization table.  Both groins were prepped and draped in the standard sterile fashion.  IV anesthesia was delivered with fentanyl and Versed for conscious sedation protocol.  Next the left common femoral artery was entered percutaneously and a 5 Romanian sheath was inserted over wire.  We then proceeded with the abdominal aortogram and a 5 Romanian pigtail catheter was inserted into the abdominal aorta below the last rib above the renal arteries.  Abdominal aortogram was performed with 20 mL of contrast at 15 mL a second and 900 psi.  The pigtail catheter was then advanced to the proximal portion of the distal thoracic aorta.   Thoracic aortogram was performed with 25 mL of contrast at 15 mL a second and 900 psi.  The pigtail catheter was then pulled back to the iliac bifurcation.  Bilateral iliac angiography was performed with 20 mL of contrast at 10 mL a second and 600 psi.  Next bilateral lower extremity runoff was performed with 60 mL of contrast at 12 mL a second and 600 psi.  The 5 Algerian pigtail catheter was then removed from the sheath and the sheath was flushed.  The sheath was then upsized to a 6 Algerian standard sheath and hemostasis was obtained with deployment of a vascade device.  His completed our procedure and the patient tolerated procedure well.  There were no complications.  She was in stable condition upon transport to the recovery room in the PACU.     Blood loss: 20 mL  Contrast: 125 mL  Complications: None     Results-angiographic  1.  The abdominal aorta was diffusely diseased and very tortuous distally.  There was a moderate to large size aneurysm noted at the diaphragm involving the distal thoracic aorta and proximal portion of the abdominal aorta.  2.  Abdominal aorta also had a moderate size aneurysm of the distal abdominal aorta involving the iliac bifurcation which was diffusely tortuous.  3.  The right ostial and proximal common iliac artery is totally occluded.  The distal common iliac artery and right external iliac artery flow with collaterals from the right internal iliac artery.  4.  The left common iliac artery is somewhat calcified but patent throughout its length.  5.  Both right and left external iliac arteries are diffusely narrowed but free of significant stenosis  6.  Both right and left common femoral arteries are diffusely narrowed but free of significant stenosis  7.  The right superficial femoral artery is diffusely narrowed throughout its length but free of significant stenosis  8.  The left superficial femoral artery fills in a string-like fashion proximally and then is totally occluded and  fills distally from collaterals off of the left profunda femoris at Rogelio's canal  9.  Both right left popliteal arteries are diffusely narrowed but free of significant stenosis  10.  The right foot has sluggish three-vessel runoff predominantly from the right anterior tibial artery  11.  The left foot has sluggish 2 vessel runoff to the foot predominantly from the left anterior tibial artery  12.  Both right and left renal arteries were nonselectively and the very tortuous and free of significant stenosis.  It was mild to moderate disease of the left renal artery in its midportion.        Conclusion  1.  Moderate to large size proximal abdominal aortic aneurysm which could involve the distal portion of the descending thoracic aorta.  The aneurysm appears to be right at the diaphragm.  2.  Moderate size distal abdominal aortic aneurysm at the iliac bifurcation.  The distal abdominal aorta is very tortuous with an S-shaped distally.  3.  Totally occluded right common iliac artery which fills distally from collaterals off of the left internal iliac artery  4.  Totally occluded left proximal/mid superficial femoral artery which fills at rogelio's canal from collaterals off the left profunda femoris.  5.  Diffuse infrapopliteal disease with very small arteries.  Sluggish three-vessel runoff to the right foot predominantly from the anterior tibial artery.  Sluggish 2 vessel runoff to the left foot predominantly from the left anterior tibial artery.     Recommendations  1.  Standard post-angiography care orders  2.  We'll review angiogram with Dr. Wharton and  .  With advanced age and comorbidities she would be high risk for aneurysm repair, and lower extremity bypass surgery  3.  We could consider percutaneous intervention of the right common iliac with elevated very difficult.  The left SFA can be treated by percutaneous intervention at some point.   4.  Follow-up in one week             Status: Final result        MyChart Results Release    MyChart Status: Active  Results Release         CTA Chest Abdomen Pelvis: Result Notes    Older Notes       Notes recorded by Skinny Rincon MD on 4/24/2018 at 3:25 PM CDT  [facility] review.            PACS Images for [facility] [place] Viewer     Show images for CTA Chest Abdomen Pelvis    All Reviewers List    Aundrea Jacobson LPN on 4/25/2018 09:29   Skinny Rincon MD on 4/24/2018 15:25      Contains abnormal data CTA Chest Abdomen Pelvis  Order: [id number]  · Status: Final result     · Visible to patient: Yes (seen)      · Next appt: None     · Dx: Thoracic aortic aneurysm without rupture     · 1 Result Note      Details    Reading Physician Reading Date Result Priority   Annie Copeland MD  [phone] 4/24/2018      Narrative & Impression  EXAMINATION:  CTA CHEST ABDOMEN PELVIS     CLINICAL HISTORY:  Thoracic aortic aneurysm (TAA), known, follow up;TAA;  Thoracic aortic aneurysm, without rupture     TECHNIQUE:  Axial computed tomographic angiography images of the chest, abdomen and pelvis with and without intravenous contrast using CT angiography protocol.  Approximately 120 mL of Visipaque 320 mg was administered intravenously.     COMPARISON:  None     FINDINGS:  VASCULATURE:     Aorta: Advanced arteriosclerotic change of the thoracic and abdominal aorta.  Fusiform unruptured aneurysm of the distal descending thoracic aorta, which measures 4.1 cm x 4.6 cm and is partially thrombosed.  Large fusiform unruptured aneurysm of the abdominal aorta.  This measures up to 5.3 cm by 4.8 cm.  Aneurysm is partially thrombosed and spans a length of approximately 11.4 cm.  The aneurysm begins 1.3 cm distal to the left renal artery origin and extends as far distally as 4.4 cm above the aortic bifurcation.  Fusiform ectasia of the most distal abdominal aorta, immediately above the iliac bifurcation.  No dissection.     Pulmonary arteries: The main pulmonary arterial trunk is normal  in caliber.  There is no filling defect in the main pulmonary arteries.  There are filling defects within pulmonary arterial branches supplying the right lower lobe.  Some of these are eccentrically located within the arterial lumen common suggesting that they may be chronic in nature.     Great vessels of the aortic arch: Mild narrowing of the right innominate artery origin.  Moderately narrowing of the right subclavian artery origin.  Moderate narrowing of the left common carotid artery are trimmed.  Moderate narrowing of the left subclavian artery origin.  No dissection.     Celiac trunk and mesenteric arteries: Mild narrowing of the celiac trunk.  Moderately narrowing of the SMA origin, with distal reconstitution.     Renal arteries: Severe narrowing of the left renal artery origin.  Severe narrowing of the right renal artery origin.     Iliac/femoral arteries: Severe arteriosclerotic narrowing of the left common iliac artery.  Severe narrowing of the left internal iliac artery origin.  Mild arteriosclerotic change of the remainder of the left internal iliac artery.  The Moderate 2 advanced arteriosclerotic change of the left external iliac artery.  Severe arteriosclerotic narrowing throughout the length of the right common iliac artery.  High-grade narrowing of the right internal iliac artery are origin.  Mild arteriosclerotic change of the remainder of the right internal iliac artery.  High-grade narrowing of the right external iliac artery origin.  Mild arteriosclerotic change of the remainder of the right external iliac artery.  Moderate arteriosclerotic change of the right common femoral artery.  Mild arteriosclerotic change of the proximal right superficial femoral artery.  Mild arteriosclerotic change of the left common femoral artery.  Moderately arteriosclerotic change of the proximal left superficial femoral artery.     CHEST:     Lungs advanced bilateral centrilobar and paraseptal emphysematous  changes.  Calcified right upper lobe granuloma.  Subpleural scarring in the lung apices.  Mild atelectasis in the dependent lower bases.  Mild endobronchial mucous plugging in the right lower lobe.     Pleural space: Small bilateral pleural effusions.  No pneumothorax.     Heart: There are prominent coronary artery calcifications.  Aortic valve calcifications.  Cardiomegaly.     ABDOMEN:     Liver: Moderately intrahepatic and extrahepatic ductal dilatation.     Gallbladder and bile ducts: Gallbladder is either contracted or surgically abscess and.     Pancreas: Unremarkable.  No ductal dilatation.     Spleen: Unremarkable.     Adrenals: Unremarkable     Kidneys and ureters: Right-sided ureteral stent appears appropriately position.  Moderate right sided hydronephrosis.  Bilateral renal cysts, measuring up to 5.6 cm in diameter.  Tiny 1 mm nonobstructing right renal calculus.  Slightly hyperdense 4 mm left renal cortical lesion, which most likely represents a proteinaceous or hemorrhagic cysts.  Right renal cortical thinning.     Stomach and bowel: Unremarkable.     PELVIS:     Appendix: No findings to suggest acute appendicitis.     Bladder: Unremarkable     Reproductive: Vaginal pessary.     CHEST, ABDOMEN AND PELVIS:     Intraperitoneal space: Unremarkable.  No free air or fluid.     Bones/joints: Degenerative changes of the spine.  No acute fracture or dislocation.     Soft tissues: Heterotrophic ossification of the left gluteal subcutaneous tissues..  Irregular-shaped mass in the upper outer quadrant of the left breast, measuring 2.8 x 1.3 cm.  This abuts the skin surface.     Lymph nodes: Unremarkable.     IMPRESSION:      1. Unruptured fusiform aneurysms of the distal thoracic aorta in the infrarenal abdominal aorta.  2. Right lower lobe pulmonary emboli, with Mild clot burden.  3. Multifocal arteriosclerotic changes as above.  4. Moderately distension of the infra hepatic and extrahepatic ducts, which may be  secondary to mass affect on the distal common bile duct by the large abdominal aortic aneurysm.  5. Moderately right side hydronephrosis despite appropriate positioning of the right ureteral stent.  6. Small bilateral pleural effusions and atelectasis in the lung bases.  7. Irregular-shaped mass in the upper outer quadrant of the left breast as discussed.  Advise mammographic correlation.  8. Additional non acute findings as above.  The above results were immediately reviewed by Dr. Angélica Ridley.     This report was flagged in Epic as abnormal.        Electronically signed by: Annie Copeland MD  Date:                                            04/24/2018  Time:                                           12:35               Exam Ended: 04/24/18 08:40 Last Resulted: 04/24/18 12:35                    Assessment:     1. Essential hypertension    2. Paroxysmal atrial fibrillation    3. Tobacco use disorder    4. PVD (peripheral vascular disease)    5. Mixed hyperlipidemia    6. Abdominal aortic aneurysm (AAA) without rupture    7. Malignant neoplasm of upper-outer quadrant of left female breast, unspecified estrogen receptor status    8. Renal insufficiency      Plan:   Claudia was seen today for hypertension, atrial fibrillation, peripheral vascular disease and establish care.    Diagnoses and all orders for this visit:    Essential hypertension  -     IN OFFICE EKG 12-LEAD (to Muse)    Paroxysmal atrial fibrillation  -     IN OFFICE EKG 12-LEAD (to Muse)    Tobacco use disorder  -     IN OFFICE EKG 12-LEAD (to Muse)    PVD (peripheral vascular disease)  -     IN OFFICE EKG 12-LEAD (to Muse)    Mixed hyperlipidemia  -     IN OFFICE EKG 12-LEAD (to Muse)    Abdominal aortic aneurysm (AAA) without rupture  -     US Abdomen Complete; Future    Malignant neoplasm of upper-outer quadrant of left female breast, unspecified estrogen receptor status    Renal insufficiency     Pt will continue current medical regimen    Will  review old records from Dr Ross   repeat U/S of AAA    Will repeat U/S of AAA    Same meds for now    F/u with Dr Aparicio  No follow-ups on file.

## 2022-07-13 ENCOUNTER — TELEPHONE (OUTPATIENT)
Dept: CARDIOLOGY | Facility: CLINIC | Age: 82
End: 2022-07-13
Payer: MEDICARE

## 2022-07-13 NOTE — TELEPHONE ENCOUNTER
I spoke with daughter.  AAA has increased in size over the past few years and is no 11.1 cm long by 6.4 cm by 6.6 cm.  Daughter reports her mother has metastatic breast cancer.  Daughter reports that a vascular surgeon indicated an open AAA repair was too dangerous.  In light of extensive bilateral iliac disease, percutaneous stenting of the AAA was not felt to be feasible.  I offered consultation with another interventional cardiologist.  Daughter understands but she and her mother and her siblings al request continued conservative management.

## 2022-08-02 ENCOUNTER — HOSPITAL ENCOUNTER (OUTPATIENT)
Dept: RADIOLOGY | Facility: HOSPITAL | Age: 82
Discharge: HOME | End: 2022-08-02
Attending: SPECIALIST
Payer: MEDICARE

## 2022-08-02 ENCOUNTER — HOSPITAL ENCOUNTER (OUTPATIENT)
Dept: PREADMISSION TESTING | Facility: HOSPITAL | Age: 82
Discharge: HOME | End: 2022-08-02
Attending: SPECIALIST
Payer: MEDICARE

## 2022-08-02 VITALS
DIASTOLIC BLOOD PRESSURE: 80 MMHG | HEIGHT: 60 IN | WEIGHT: 126 LBS | TEMPERATURE: 98 F | OXYGEN SATURATION: 97 % | SYSTOLIC BLOOD PRESSURE: 153 MMHG | BODY MASS INDEX: 24.74 KG/M2 | HEART RATE: 58 BPM | RESPIRATION RATE: 16 BRPM

## 2022-08-02 DIAGNOSIS — Z01.818 PREOP EXAMINATION: ICD-10-CM

## 2022-08-02 DIAGNOSIS — Z01.818 PREOP EXAMINATION: Primary | ICD-10-CM

## 2022-08-02 DIAGNOSIS — Z01.818 PREOP TESTING: Primary | ICD-10-CM

## 2022-08-02 PROCEDURE — 71046 X-RAY EXAM CHEST 2 VIEWS: CPT | Mod: TC

## 2022-08-02 RX ORDER — CEFAZOLIN SODIUM 2 G/50ML
2 SOLUTION INTRAVENOUS ONCE
Status: CANCELLED | OUTPATIENT
Start: 2022-08-04

## 2022-08-02 RX ORDER — NITROFURANTOIN 25; 75 MG/1; MG/1
100 CAPSULE ORAL 2 TIMES DAILY
COMMUNITY
Start: 2022-06-23

## 2022-08-02 NOTE — PRE-PROCEDURE INSTRUCTIONS
preadmit assessment complete, questions answered. Pt/daughter voiced understanding. H&P will be completed day of SX per [facility] with Dr Hernandez. Pt signed consent with daughter and nurse as witness to signature. Pt to initial consent day of SX. Pt visually impaired, is able to sign if hand placed on line of consent

## 2022-08-02 NOTE — DISCHARGE INSTRUCTIONS
To confirm, Your doctor has instructed you that surgery is scheduled for: Thursday August 4, 2022    Pre-Op will call the afternoon prior to surgery between 4:00 and 6:00 PM with the final arrival time.  Wednesday    Please report to Registration at front of the hospital --it is best to park in the garage on [address] [facility]    Do not eat or drink anything after midnight the night before your surgery - THIS INCLUDES  WATER, GUM, MINTS AND CANDY.    YOU MAY BRUSH YOUR TEETH     TAKE APPROVED  MEDICATIONS WITH A SMALL SIP OF WATER THE MORNING OF YOUR PROCEDURE: See Medication list    ONLY if you are diabetic, check your sugar in the morning before your procedure.       Do not take any diabetic medicines or insulin the morning of surgery .     PLEASE NOTE:  The surgery schedule has many variables which may affect the time of your surgery case.  Family members should be available if your surgery time changes.  Plan to be here the day of your procedure between 4-6 hours.    DO NOT TAKE THESE MEDICATIONS 5-7 DAYS PRIOR to your procedure or per your surgeon's request: ASPIRIN, ALEVE, ADVIL, IBUPROFEN,  JANEL SELTZER, BC , FISH OIL , VITAMIN E, HERBALS  (May take Tylenol)    ONLY if you are prescribed any types of blood thinners such as:  Aspirin, Coumadin, Plavix, Pradaxa, Xarelto, Aggrenox, Effient, Eliquis, Savasya, Brilinta, or any other, ask your surgeon whether you should stop taking them and how long before surgery you should stop.  You may also need to verify with the prescribing physician if it is ok to stop your medication.                                                     IMPORTANT INSTRUCTIONS    Do not smoke, vape or drink alcoholic beverages 24 hours prior to your procedure.  Shower the night before AND the morning of your procedure with a Chlorhexidine wash such as Hibiclens or Dial antibacterial soap from the neck down.    Do not get it on your face or in your eyes.  You may use your own shampoo and face wash.  This helps your skin to be as bacteria free as possible.   Do not apply any deodorant, lotion or powder after you shower.   DO NOT remove hair from the surgery site.  Do not shave the incision site unless you are given specific instructions to do so.    Sleep in a bed with clean sheets.  Do not sleep with a pet in the bed.   If you wear contact lenses, dentures, hearing aids or glasses, bring a container to put them in during surgery and give to a family member for safe keeping.    Please leave all jewelry, piercing's and valuables at home.   If your doctor has scheduled you for an overnight stay, bring a small overnight bag with any personal items you need.  Wear comfortable clothing that is easy to remove and place back on after surgery.     Make arrangements in advance for transportation home by a responsible adult.    You must make arrangements for transportation, TAXI'S, UBER'S OR LYFTS ARE NOT ALLOWED.      If you have any questions about these instructions, call Pre-Op Admit  Nursing at [phone] , Pre-Op Day Surgery Unit at [phone]

## 2022-08-04 ENCOUNTER — ANESTHESIA EVENT (OUTPATIENT)
Dept: SURGERY | Facility: HOSPITAL | Age: 82
End: 2022-08-04
Payer: MEDICARE

## 2022-08-04 ENCOUNTER — HOSPITAL ENCOUNTER (OUTPATIENT)
Facility: HOSPITAL | Age: 82
Discharge: HOME | End: 2022-08-04
Attending: SPECIALIST | Admitting: SPECIALIST
Payer: MEDICARE

## 2022-08-04 ENCOUNTER — ANESTHESIA (OUTPATIENT)
Dept: SURGERY | Facility: HOSPITAL | Age: 82
End: 2022-08-04
Payer: MEDICARE

## 2022-08-04 VITALS
RESPIRATION RATE: 16 BRPM | HEART RATE: 77 BPM | BODY MASS INDEX: 24.74 KG/M2 | SYSTOLIC BLOOD PRESSURE: 157 MMHG | WEIGHT: 126 LBS | OXYGEN SATURATION: 99 % | TEMPERATURE: 98 F | HEIGHT: 60 IN | DIASTOLIC BLOOD PRESSURE: 70 MMHG

## 2022-08-04 DIAGNOSIS — Z01.818 PREOP TESTING: ICD-10-CM

## 2022-08-04 DIAGNOSIS — N20.0 KIDNEY STONE: ICD-10-CM

## 2022-08-04 DIAGNOSIS — N13.30 HYDRONEPHROSIS OF RIGHT KIDNEY: Primary | ICD-10-CM

## 2022-08-04 PROCEDURE — 25000003 PHARM REV CODE 250: Performed by: NURSE ANESTHETIST, CERTIFIED REGISTERED

## 2022-08-04 PROCEDURE — C1769 GUIDE WIRE: HCPCS | Performed by: SPECIALIST

## 2022-08-04 PROCEDURE — 63600175 PHARM REV CODE 636 W HCPCS: Performed by: STUDENT IN AN ORGANIZED HEALTH CARE EDUCATION/TRAINING PROGRAM

## 2022-08-04 PROCEDURE — C2617 STENT, NON-COR, TEM W/O DEL: HCPCS | Performed by: SPECIALIST

## 2022-08-04 PROCEDURE — 63600175 PHARM REV CODE 636 W HCPCS: Performed by: SPECIALIST

## 2022-08-04 PROCEDURE — 71000015 HC POSTOP RECOV 1ST HR: Performed by: SPECIALIST

## 2022-08-04 PROCEDURE — 71000033 HC RECOVERY, INTIAL HOUR: Performed by: SPECIALIST

## 2022-08-04 PROCEDURE — 37000008 HC ANESTHESIA 1ST 15 MINUTES: Performed by: SPECIALIST

## 2022-08-04 PROCEDURE — 36000706: Performed by: SPECIALIST

## 2022-08-04 PROCEDURE — 36000707: Performed by: SPECIALIST

## 2022-08-04 PROCEDURE — 27201423 OPTIME MED/SURG SUP & DEVICES STERILE SUPPLY: Performed by: SPECIALIST

## 2022-08-04 PROCEDURE — 37000009 HC ANESTHESIA EA ADD 15 MINS: Performed by: SPECIALIST

## 2022-08-04 PROCEDURE — 27202107 HC XP QUATRO SENSOR: Performed by: ANESTHESIOLOGY

## 2022-08-04 PROCEDURE — 63600175 PHARM REV CODE 636 W HCPCS: Performed by: NURSE ANESTHETIST, CERTIFIED REGISTERED

## 2022-08-04 PROCEDURE — 27000673 HC TUBING BLOOD Y: Performed by: ANESTHESIOLOGY

## 2022-08-04 PROCEDURE — 71000039 HC RECOVERY, EACH ADD'L HOUR: Performed by: SPECIALIST

## 2022-08-04 PROCEDURE — 27000656 HC EYE GOGGLES: Performed by: ANESTHESIOLOGY

## 2022-08-04 PROCEDURE — 25000003 PHARM REV CODE 250: Performed by: SPECIALIST

## 2022-08-04 PROCEDURE — 71000016 HC POSTOP RECOV ADDL HR: Performed by: SPECIALIST

## 2022-08-04 DEVICE — STENT URETERAL FIRM 4.8FX26 ASCERTA: Type: IMPLANTABLE DEVICE | Site: URETER | Status: FUNCTIONAL

## 2022-08-04 RX ORDER — FENTANYL CITRATE 50 UG/ML
INJECTION, SOLUTION INTRAMUSCULAR; INTRAVENOUS
Status: DISCONTINUED | OUTPATIENT
Start: 2022-08-04 | End: 2022-08-04

## 2022-08-04 RX ORDER — SUCCINYLCHOLINE CHLORIDE 20 MG/ML
INJECTION INTRAMUSCULAR; INTRAVENOUS
Status: DISCONTINUED | OUTPATIENT
Start: 2022-08-04 | End: 2022-08-04

## 2022-08-04 RX ORDER — SODIUM CHLORIDE 0.9 % (FLUSH) 0.9 %
10 SYRINGE (ML) INJECTION
Status: DISCONTINUED | OUTPATIENT
Start: 2022-08-04 | End: 2022-08-04 | Stop reason: HOSPADM

## 2022-08-04 RX ORDER — DIPHENHYDRAMINE HYDROCHLORIDE 50 MG/ML
12.5 INJECTION INTRAMUSCULAR; INTRAVENOUS
Status: DISCONTINUED | OUTPATIENT
Start: 2022-08-04 | End: 2022-08-04 | Stop reason: HOSPADM

## 2022-08-04 RX ORDER — ONDANSETRON 2 MG/ML
INJECTION INTRAMUSCULAR; INTRAVENOUS
Status: DISCONTINUED | OUTPATIENT
Start: 2022-08-04 | End: 2022-08-04

## 2022-08-04 RX ORDER — ONDANSETRON 2 MG/ML
4 INJECTION INTRAMUSCULAR; INTRAVENOUS DAILY PRN
Status: DISCONTINUED | OUTPATIENT
Start: 2022-08-04 | End: 2022-08-04 | Stop reason: HOSPADM

## 2022-08-04 RX ORDER — CEPHALEXIN 500 MG/1
500 CAPSULE ORAL EVERY 12 HOURS
Qty: 6 CAPSULE | Refills: 0
Start: 2022-08-04

## 2022-08-04 RX ORDER — MEPERIDINE HYDROCHLORIDE 50 MG/ML
12.5 INJECTION INTRAMUSCULAR; INTRAVENOUS; SUBCUTANEOUS EVERY 10 MIN PRN
Status: DISCONTINUED | OUTPATIENT
Start: 2022-08-04 | End: 2022-08-04 | Stop reason: HOSPADM

## 2022-08-04 RX ORDER — LIDOCAINE HYDROCHLORIDE 20 MG/ML
INJECTION, SOLUTION EPIDURAL; INFILTRATION; INTRACAUDAL; PERINEURAL
Status: DISCONTINUED | OUTPATIENT
Start: 2022-08-04 | End: 2022-08-04

## 2022-08-04 RX ORDER — ROCURONIUM BROMIDE 10 MG/ML
INJECTION, SOLUTION INTRAVENOUS
Status: DISCONTINUED | OUTPATIENT
Start: 2022-08-04 | End: 2022-08-04

## 2022-08-04 RX ORDER — PHENYLEPHRINE HYDROCHLORIDE 10 MG/ML
INJECTION INTRAVENOUS
Status: DISCONTINUED | OUTPATIENT
Start: 2022-08-04 | End: 2022-08-04

## 2022-08-04 RX ORDER — HYDROCODONE BITARTRATE AND ACETAMINOPHEN 5; 325 MG/1; MG/1
1 TABLET ORAL EVERY 4 HOURS PRN
Status: DISCONTINUED | OUTPATIENT
Start: 2022-08-04 | End: 2022-08-04 | Stop reason: HOSPADM

## 2022-08-04 RX ORDER — HYDROMORPHONE HYDROCHLORIDE 1 MG/ML
0.2 INJECTION, SOLUTION INTRAMUSCULAR; INTRAVENOUS; SUBCUTANEOUS EVERY 5 MIN PRN
Status: DISCONTINUED | OUTPATIENT
Start: 2022-08-04 | End: 2022-08-04 | Stop reason: HOSPADM

## 2022-08-04 RX ORDER — LIDOCAINE HYDROCHLORIDE 20 MG/ML
JELLY TOPICAL
Status: DISCONTINUED | OUTPATIENT
Start: 2022-08-04 | End: 2022-08-04 | Stop reason: HOSPADM

## 2022-08-04 RX ORDER — TRAMADOL HYDROCHLORIDE 50 MG/1
50 TABLET ORAL EVERY 8 HOURS PRN
Qty: 12 TABLET | Refills: 0
Start: 2022-08-04

## 2022-08-04 RX ORDER — OXYCODONE HYDROCHLORIDE 5 MG/1
5 TABLET ORAL
Status: DISCONTINUED | OUTPATIENT
Start: 2022-08-04 | End: 2022-08-04 | Stop reason: HOSPADM

## 2022-08-04 RX ORDER — PROPOFOL 10 MG/ML
VIAL (ML) INTRAVENOUS
Status: DISCONTINUED | OUTPATIENT
Start: 2022-08-04 | End: 2022-08-04

## 2022-08-04 RX ORDER — CEFAZOLIN SODIUM 2 G/50ML
2 SOLUTION INTRAVENOUS ONCE
Status: COMPLETED | OUTPATIENT
Start: 2022-08-04 | End: 2022-08-04

## 2022-08-04 RX ORDER — FAMOTIDINE 10 MG/ML
INJECTION INTRAVENOUS
Status: DISCONTINUED | OUTPATIENT
Start: 2022-08-04 | End: 2022-08-04

## 2022-08-04 RX ORDER — ACETAMINOPHEN 10 MG/ML
INJECTION, SOLUTION INTRAVENOUS
Status: DISCONTINUED | OUTPATIENT
Start: 2022-08-04 | End: 2022-08-04

## 2022-08-04 RX ADMIN — PHENYLEPHRINE HYDROCHLORIDE 100 MCG: 10 INJECTION INTRAVENOUS at 10:08

## 2022-08-04 RX ADMIN — FENTANYL CITRATE 100 MCG: 50 INJECTION INTRAMUSCULAR; INTRAVENOUS at 10:08

## 2022-08-04 RX ADMIN — GLYCOPYRROLATE 0.4 MG: 0.2 INJECTION, SOLUTION INTRAMUSCULAR; INTRAVITREAL at 10:08

## 2022-08-04 RX ADMIN — LIDOCAINE HYDROCHLORIDE 60 MG: 20 INJECTION, SOLUTION INTRAVENOUS at 10:08

## 2022-08-04 RX ADMIN — FAMOTIDINE 20 MG: 10 INJECTION, SOLUTION INTRAVENOUS at 10:08

## 2022-08-04 RX ADMIN — SODIUM CHLORIDE, SODIUM LACTATE, POTASSIUM CHLORIDE, AND CALCIUM CHLORIDE: .6; .31; .03; .02 INJECTION, SOLUTION INTRAVENOUS at 10:08

## 2022-08-04 RX ADMIN — CEFAZOLIN SODIUM 2 G: 2 SOLUTION INTRAVENOUS at 10:08

## 2022-08-04 RX ADMIN — SUGAMMADEX 200 MG: 100 INJECTION, SOLUTION INTRAVENOUS at 10:08

## 2022-08-04 RX ADMIN — SUCCINYLCHOLINE CHLORIDE 120 MG: 20 INJECTION, SOLUTION INTRAMUSCULAR; INTRAVENOUS at 10:08

## 2022-08-04 RX ADMIN — ROCURONIUM BROMIDE 15 MG: 10 INJECTION, SOLUTION INTRAVENOUS at 10:08

## 2022-08-04 RX ADMIN — PROPOFOL 50 MG: 10 INJECTION, EMULSION INTRAVENOUS at 10:08

## 2022-08-04 RX ADMIN — ONDANSETRON 4 MG: 2 INJECTION INTRAMUSCULAR; INTRAVENOUS at 10:08

## 2022-08-04 RX ADMIN — ACETAMINOPHEN 1000 MG: 10 INJECTION, SOLUTION INTRAVENOUS at 10:08

## 2022-08-04 RX ADMIN — PROMETHAZINE HYDROCHLORIDE 6.25 MG: 25 INJECTION INTRAMUSCULAR; INTRAVENOUS at 11:08

## 2022-08-04 RX ADMIN — ONDANSETRON 4 MG: 2 INJECTION INTRAMUSCULAR; INTRAVENOUS at 11:08

## 2022-08-04 NOTE — DISCHARGE SUMMARY
Patient comes in for stent exchange    Procedure is done w no complication    After a brief stay in recovery, patient is discharged in good condition    Meds given:  Keflex Ultram    F/u office:  3 months

## 2022-08-04 NOTE — TRANSFER OF CARE
Anesthesia Transfer of Care Note    Patient: Claudia Lerner    Procedure(s) Performed: Procedure(s) (LRB):  CYSTOSCOPY, WITH URETERAL STENT INSERTION (Right)  REMOVAL, STENT, URETER (Right)    Patient location: PACU    Anesthesia Type: general    Transport from OR: Transported from OR on room air with adequate spontaneous ventilation    Post pain: adequate analgesia    Post assessment: no apparent anesthetic complications    Post vital signs: stable    Level of consciousness: awake and alert    Nausea/Vomiting: no nausea/vomiting    Complications: none    Transfer of care protocol was followed      Last vitals:   Visit Vitals  BP (!) 158/72   Pulse 64   Temp 36.6 °C (97.8 °F) (Oral)   Resp 17   Ht 5' (1.524 m)   Wt 57.2 kg (126 lb)   LMP  (LMP Unknown)   SpO2 99%   Breastfeeding No   BMI 24.61 kg/m²

## 2022-08-04 NOTE — ANESTHESIA PREPROCEDURE EVALUATION
08/04/2022  Claudia Lerner is a 81 y.o., female.    Patient Active Problem List   Diagnosis    Malignant neoplasm of upper-outer quadrant of left female breast    Hydronephrosis    Acute hypoxemic respiratory failure    Hydropneumothorax    Renal insufficiency    Bladder prolapse, female, acquired    Essential hypertension    Macrocytic anemia    Tobacco use disorder    Acute respiratory failure with hypoxemia    Pneumothorax, left    Chronic atrial fibrillation    Hypokalemia    Paroxysmal atrial fibrillation    Weakness    Intractable pain    Sepsis due to urinary tract infection    Complicated UTI (urinary tract infection)    Acquired hydroureter    Acute blood loss anemia    PVD (peripheral vascular disease)    Hydronephrosis of right kidney    Mixed hyperlipidemia    Abdominal aortic aneurysm (AAA) without rupture       Past Surgical History:   Procedure Laterality Date    aortagram with runoff  03/16/2018    APPENDECTOMY  1965    CHOLECYSTECTOMY  1980    CYSTOSCOPY W/ RETROGRADES Right 5/28/2020    Procedure: CYSTOSCOPY, WITH RETROGRADE PYELOGRAM;  Surgeon: Mario Hernandez MD;  Location: [facility];  Service: Urology;  Laterality: Right;    CYSTOSCOPY W/ URETERAL STENT PLACEMENT Right 7/27/2018    Procedure: CYSTOSCOPY, WITH URETERAL STENT INSERTION;  Surgeon: Tremaine Victor MD;  Location: [facility];  Service: Urology;  Laterality: Right;  NORI VIDEO CONFIRMED 7/10/DME    CYSTOSCOPY W/ URETERAL STENT PLACEMENT Right 11/16/2018    Procedure: CYSTOSCOPY, WITH URETERAL STENT INSERTION;  Surgeon: Tremaine Victor MD;  Location: [facility];  Service: Urology;  Laterality: Right;  STYKER CONFIRMED 11/7/DME    CYSTOSCOPY W/ URETERAL STENT PLACEMENT Right 5/24/2019    Procedure: CYSTOSCOPY, WITH URETERAL STENT INSERTION;  Surgeon: Tremaine Victor MD;  Location: [facility];   Service: Urology;  Laterality: Right;  nori video confirmed 5/17/dme    CYSTOSCOPY W/ URETERAL STENT PLACEMENT Right 9/6/2019    Procedure: CYSTOSCOPY, WITH URETERAL STENT INSERTION;  Surgeon: Tremaine Victor MD;  Location: [facility] OR;  Service: Urology;  Laterality: Right;    CYSTOSCOPY W/ URETERAL STENT PLACEMENT Right 12/6/2019    Procedure: CYSTOSCOPY, WITH URETERAL STENT INSERTION;  Surgeon: Tremaine Victor MD;  Location: [facility] OR;  Service: Urology;  Laterality: Right;  NORI VIDEO CONFIRMED DME    CYSTOSCOPY W/ URETERAL STENT PLACEMENT Right 5/28/2020    Procedure: CYSTOSCOPY, WITH URETERAL STENT INSERTION;  Surgeon: Mario Hernandez MD;  Location: [facility] OR;  Service: Urology;  Laterality: Right;    CYSTOSCOPY W/ URETERAL STENT PLACEMENT Right 11/5/2020    Procedure: CYSTOSCOPY, WITH URETERAL STENT INSERTION;  Surgeon: Mario Hernandez MD;  Location: [facility];  Service: Urology;  Laterality: Right;    CYSTOSCOPY W/ URETERAL STENT PLACEMENT Right 2/25/2021    Procedure: CYSTOSCOPY, WITH URETERAL STENT CHANGE;  Surgeon: Mario Hernandez MD;  Location: [facility];  Service: Urology;  Laterality: Right;    CYSTOSCOPY W/ URETERAL STENT PLACEMENT Right 6/10/2021    Procedure: CYSTOSCOPY, WITH URETERAL STENT CHANGE;  Surgeon: Mario Hernandez MD;  Location: [facility];  Service: Urology;  Laterality: Right;    CYSTOSCOPY W/ URETERAL STENT PLACEMENT Right 10/7/2021    Procedure: CYSTOSCOPY, WITH URETERAL STENT INSERTION;  Surgeon: Mario Hernandez MD;  Location: [facility];  Service: Urology;  Laterality: Right;    CYSTOSCOPY W/ URETERAL STENT PLACEMENT N/A 1/27/2022    Procedure: CYSTOSCOPY, WITH URETERAL STENT INSERTION;  Surgeon: Mario Hernandez MD;  Location: [facility] OR;  Service: Urology;  Laterality: N/A;    CYSTOSCOPY W/ URETERAL STENT PLACEMENT N/A 4/28/2022    Procedure: CYSTOSCOPY, WITH URETERAL STENT INSERTION;  Surgeon: Mario Hernandez MD;  Location: [facility];  Service: Urology;   Laterality: N/A;    CYSTOSCOPY W/ URETERAL STENT REMOVAL Right 11/16/2018    Procedure: CYSTOSCOPY, WITH URETERAL STENT REMOVAL;  Surgeon: Tremaine Victor MD;  Location: [facility] OR;  Service: Urology;  Laterality: Right;    CYSTOSCOPY W/ URETERAL STENT REMOVAL Right 5/24/2019    Procedure: CYSTOSCOPY, WITH URETERAL STENT REMOVAL;  Surgeon: Tremaine Victor MD;  Location: [facility] OR;  Service: Urology;  Laterality: Right;    CYSTOSCOPY W/ URETERAL STENT REMOVAL Right 9/6/2019    Procedure: CYSTOSCOPY, WITH URETERAL STENT REMOVAL;  Surgeon: Tremaine Victor MD;  Location: [facility] OR;  Service: Urology;  Laterality: Right;    CYSTOSCOPY W/ URETERAL STENT REMOVAL Right 12/6/2019    Procedure: CYSTOSCOPY, WITH URETERAL STENT REMOVAL;  Surgeon: Tremaine Victor MD;  Location: [facility] OR;  Service: Urology;  Laterality: Right;    CYSTOSCOPY W/ URETERAL STENT REMOVAL Right 5/28/2020    Procedure: CYSTOSCOPY, WITH URETERAL STENT REMOVAL;  Surgeon: Mario eHrnandez MD;  Location: [facility];  Service: Urology;  Laterality: Right;    CYSTOSCOPY W/ URETERAL STENT REMOVAL Right 11/5/2020    Procedure: CYSTOSCOPY, WITH URETERAL STENT REMOVAL;  Surgeon: Mario Hernandez MD;  Location: [facility];  Service: Urology;  Laterality: Right;    CYSTOSCOPY W/ URETERAL STENT REMOVAL Right 1/27/2022    Procedure: CYSTOSCOPY, WITH URETERAL STENT REMOVAL;  Surgeon: Mario Hernandez MD;  Location: [facility];  Service: Urology;  Laterality: Right;    CYSTOSCOPY W/ URETERAL STENT REMOVAL N/A 4/28/2022    Procedure: CYSTOSCOPY, WITH URETERAL STENT REMOVAL;  Surgeon: Mario Hernandez MD;  Location: [facility];  Service: Urology;  Laterality: N/A;    cystoscopy with stent      HYSTERECTOMY  1965        Tobacco Use:  The patient  reports that she quit smoking about 3 years ago. Her smoking use included cigarettes. She has a 50.00 pack-year smoking history. She has never used smokeless tobacco.     Results for orders placed or performed in visit on  06/22/22   IN OFFICE EKG 12-LEAD (to [place])    Collection Time: 06/22/22 11:24 AM    Narrative    Test Reason : I10,I48.0,F17.200,I73.9,E78.2,    Vent. Rate : 076 BPM     Atrial Rate : 076 BPM     P-R Int : 150 ms          QRS Dur : 074 ms      QT Int : 404 ms       P-R-T Axes : 090 -66 063 degrees     QTc Int : 454 ms    Sinus rhythm with Premature supraventricular complexes  Left axis deviation  Nonspecific T wave abnormality  Abnormal R wave progression in the precordial leads  When compared with ECG of 25-APR-2022 12:58,  Premature supraventricular complexes are now Present    Confirmed by Skinny Short MD ([id number]) on 6/22/2022 5:48:18 PM    Referred By: NELIDA   SELF           Confirmed By:Skinny Short MD             Lab Results   Component Value Date    WBC 8.17 08/02/2022    HGB 12.2 08/02/2022    HCT 37.8 08/02/2022    MCV 99 (H) 08/02/2022     08/02/2022     BMP  Lab Results   Component Value Date     08/02/2022    K 3.4 (L) 08/02/2022     (H) 08/02/2022    CO2 21 (L) 08/02/2022    BUN 28 (H) 08/02/2022    CREATININE 1.4 08/02/2022    CALCIUM 9.1 08/02/2022    ANIONGAP 7 (L) 08/02/2022    ESTGFRAFRICA 36.9 (A) 04/25/2022    ESTGFRAFRICA 36.9 (A) 04/25/2022    EGFRNONAA 32.0 (A) 04/25/2022    EGFRNONAA 32.0 (A) 04/25/2022             Pre-op Assessment    I have reviewed the Patient Summary Reports.    I have reviewed the Nursing Notes. I have reviewed the NPO Status.   I have reviewed the Medications.     Review of Systems  Anesthesia Hx:  Hx of Anesthetic complications (h/o disorientation once)  Denies Family Hx of Anesthesia complications.  Personal Hx of Anesthesia complications, Post-Operative Nausea/Vomiting, in the past, but not with recent anesthetics / prophylaxis   Social:  Former Smoker    Hematology/Oncology:  Hematology Normal   Oncology Normal     EENT/Dental:   Glaucoma and macular degeneration; can't see in one eye and receives retinal injections in her other eye       Cardiovascular:   Hypertension Dysrhythmias (hx of afib) atrial fibrillation PVD hyperlipidemia ECG has been reviewed.  Functional Capacity unable to determine   Denies Congenital Heart Disease.    Denies Congenital Heart Disease.   Denies Deep Venous Thrombosis (DVT)  Hypertension    Pulmonary:   COPD, moderate  Chronic Obstructive Pulmonary Disease (COPD):    Renal/:   Chronic Renal Disease, CRI renal calculi Hx of prolapse bladder Kidney Function/Disease, Chronic Kidney Disease (CKD)    Hepatic/GI:  Denies Liver Disease    Musculoskeletal:  Musculoskeletal Normal  Denies Rheumatic Disease    Neurological:   CVA, no residual symptoms  Denies Dx of Headaches Denies Seizure Disorder  CVA - Cerebrovasular Accident    Endocrine:  Endocrine Normal  Denies Diabetes    Psych:  Psychiatric Normal           Physical Exam  General:  Well nourished      Airway/Jaw/Neck:  Airway Findings: Mouth Opening: Normal   General Airway Assessment: Adult Mallampati: II  TM Distance: Normal, at least 6 cm       Dental:  Dental Findings: Edentulous     Chest/Lungs:  Chest/Lungs Findings: Clear to auscultation      Heart/Vascular:  Heart Findings: Rhythm: Occasional Prematures        Mental Status:  Mental Status Findings:  Cooperative, Alert and Oriented         Anesthesia Plan  Type of Anesthesia, risks & benefits discussed:  Anesthesia Type:  general, Gen Supraglottic Airway    Patient's Preference:   Plan Factors:          Intra-op Monitoring Plan: standard ASA monitors  Intra-op Monitoring Plan Comments:   Post Op Pain Control Plan: multimodal analgesia  Post Op Pain Control Plan Comments:     Induction:   IV  Beta Blocker:  Patient is not currently on a Beta-Blocker (No further documentation required).       Informed Consent: Informed consent signed with the Patient and all parties understand the risks and agree with anesthesia plan.  All questions answered.  Anesthesia consent signed with patient.  ASA Score: 4     Day of  Surgery Review of History & Physical: I have interviewed and examined the patient. I have reviewed the patient's H&P dated:        Anesthesia Plan Notes: General LMA OK; patient with significant confusion following recent anesthesia  No Versed and Minimal fentanyl  Zofran, Pepcid        Ready For Surgery From Anesthesia Perspective.           Physical Exam  General: Well nourished    Airway:  Mallampati: II   Mouth Opening: Normal  TM Distance: Normal, at least 6 cm      Dental:  Edentulous    Chest/Lungs:  Clear to auscultation    Heart:  Rhythm: Occasional Prematures          Anesthesia Plan  Type of Anesthesia, risks & benefits discussed:    Anesthesia Type: general, Gen Supraglottic Airway  Intra-op Monitoring Plan: standard ASA monitors  Post Op Pain Control Plan: multimodal analgesia  Induction:  IV  Informed Consent: Informed consent signed with the Patient and all parties understand the risks and agree with anesthesia plan.  All questions answered.   ASA Score: 4  Day of Surgery Review of History & Physical: I have interviewed and examined the patient. I have reviewed the patient's H&P dated:   Anesthesia Plan Notes: General LMA OK; patient with significant confusion following recent anesthesia  No Versed and Minimal fentanyl  Zofran, Pepcid    Ready For Surgery From Anesthesia Perspective.       .

## 2022-08-04 NOTE — ANESTHESIA POSTPROCEDURE EVALUATION
Anesthesia Post Evaluation    Patient: Claudia Lerner    Procedure(s) Performed: Procedure(s) (LRB):  CYSTOSCOPY, WITH URETERAL STENT INSERTION (Right)  REMOVAL, STENT, URETER (Right)    Final Anesthesia Type: general      Patient location during evaluation: PACU  Patient participation: Yes- Able to Participate  Level of consciousness: awake and alert  Post-procedure vital signs: reviewed and stable  Pain management: adequate  Airway patency: patent    PONV status at discharge: No PONV  Anesthetic complications: no      Cardiovascular status: hemodynamically stable  Respiratory status: unassisted, spontaneous ventilation and room air  Hydration status: euvolemic  Follow-up not needed.          Vitals Value Taken Time   /70 08/04/22 1215   Temp  08/04/22 1226   Pulse 73 08/04/22 1224   Resp 15 08/04/22 1224   SpO2 94 % 08/04/22 1224   Vitals shown include unvalidated device data.      Event Time   Out of Recovery 12:23:17         Pain/Julio César Score: Julio César Score: 10 (8/4/2022 12:15 PM)

## 2022-08-04 NOTE — H&P
[facility]  History & Physical    SUBJECTIVE:     Chief Complaint/Reason for Admission:     History of Present Illness:  Patient is a 81 y.o. female presents with history of right hydronephrosis which is managed with serial stent exchange  Here for today for double-J stent change  PTA Medications   Medication Sig    dronedarone (MULTAQ) 400 mg Tab Take 1 tablet (400 mg total) by mouth 2 (two) times daily.    MULTIVIT WITH MINERALS/LUTEIN (MULTIVITAMIN 50 PLUS ORAL) Take 1 tablet by mouth every evening.     nitrofurantoin, macrocrystal-monohydrate, (MACROBID) 100 MG capsule Take 100 mg by mouth 2 (two) times daily.    pravastatin (PRAVACHOL) 40 MG tablet Take 1 tablet (40 mg total) by mouth once daily.    aspirin (ECOTRIN) 81 MG EC tablet Take 81 mg by mouth once daily.    clopidogrel (PLAVIX) 75 mg tablet Take 75 mg by mouth once daily.    letrozole (FEMARA) 2.5 mg Tab TAKE 1 TABLET(2.5 MG) BY MOUTH  Daily (Patient taking differently: 2.5 mg once daily TAKE 1 TABLET(2.5 MG) BY MOUTH  Daily.)    oxybutynin (DITROPAN) 5 MG Tab Take 1 tablet (5 mg total) by mouth once daily. (Patient taking differently: Take 5 mg by mouth daily as needed.)    timolol maleate 0.25% (TIMOPTIC) 0.25 % Drop Place 1 drop into the left eye 2 (two) times daily.     tramadol-acetaminophen 37.5-325 mg (ULTRACET) 37.5-325 mg Tab Take 1 tablet by mouth every 6 (six) hours as needed for Pain.       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/2018    Carotid stenosis 2018    Cataract     Clotting disorder     h/o  pulmonary emboli    COPD (chronic obstructive pulmonary disease) 2017    emphysema    Diarrhea     Fatigue     Glaucoma 2015    History of prolapse of bladder 2017    Hydronephrosis 2017    Hydropneumothorax 2017    LEFT    Hyperlipidemia 2019    Hypertension 2017    ovarian mass was removed    Hypokalemia     Hypotension     Kidney mass 2017    Macular degeneration 2011    PAD (peripheral artery disease)     PVD (peripheral vascular disease) 2018    Renal insufficiency 2017    Sepsis due to urinary tract infection 2017    Stroke 2017    no resisdual-daughter states reccents tests show no evidence of stroke    Ureteral obstruction, right 2017    Urinary incontinence 2005    UTI (urinary tract infection)      Past Surgical History:   Procedure Laterality Date    aortagram with runoff  03/16/2018    APPENDECTOMY  1965    CHOLECYSTECTOMY  1980    CYSTOSCOPY W/ RETROGRADES Right 5/28/2020    Procedure: CYSTOSCOPY, WITH RETROGRADE PYELOGRAM;  Surgeon: Mario Hernandez MD;  Location: [facility];  Service: Urology;  Laterality: Right;    CYSTOSCOPY W/ URETERAL STENT PLACEMENT Right 7/27/2018    Procedure: CYSTOSCOPY, WITH URETERAL STENT INSERTION;  Surgeon: Tremaine Victor MD;  Location: [facility];  Service: Urology;  Laterality: Right;  NORI VIDEO CONFIRMED 7/10/DME    CYSTOSCOPY W/ URETERAL STENT PLACEMENT Right 11/16/2018    Procedure: CYSTOSCOPY, WITH URETERAL STENT INSERTION;  Surgeon: Tremaine Victor MD;  Location: [facility] OR;  Service: Urology;  Laterality: Right;  STYKER CONFIRMED 11/7/DME    CYSTOSCOPY W/ URETERAL STENT PLACEMENT Right 5/24/2019    Procedure: CYSTOSCOPY, WITH URETERAL STENT INSERTION;  Surgeon: Tremaine Victor MD;  Location: [facility];  Service: Urology;  Laterality: Right;  nori video confirmed 5/17/dme    CYSTOSCOPY W/ URETERAL STENT PLACEMENT Right 9/6/2019    Procedure: CYSTOSCOPY, WITH URETERAL STENT INSERTION;  Surgeon: Tremaine Victor MD;  Location: [facility];   Service: Urology;  Laterality: Right;    CYSTOSCOPY W/ URETERAL STENT PLACEMENT Right 12/6/2019    Procedure: CYSTOSCOPY, WITH URETERAL STENT INSERTION;  Surgeon: Tremaine Victor MD;  Location: [facility] OR;  Service: Urology;  Laterality: Right;  NORI VIDEO CONFIRMED DME    CYSTOSCOPY W/ URETERAL STENT PLACEMENT Right 5/28/2020    Procedure: CYSTOSCOPY, WITH URETERAL STENT INSERTION;  Surgeon: Mario Hernandez MD;  Location: [facility];  Service: Urology;  Laterality: Right;    CYSTOSCOPY W/ URETERAL STENT PLACEMENT Right 11/5/2020    Procedure: CYSTOSCOPY, WITH URETERAL STENT INSERTION;  Surgeon: Mario Hernandez MD;  Location: [facility] OR;  Service: Urology;  Laterality: Right;    CYSTOSCOPY W/ URETERAL STENT PLACEMENT Right 2/25/2021    Procedure: CYSTOSCOPY, WITH URETERAL STENT CHANGE;  Surgeon: Mario Hernandez MD;  Location: [facility];  Service: Urology;  Laterality: Right;    CYSTOSCOPY W/ URETERAL STENT PLACEMENT Right 6/10/2021    Procedure: CYSTOSCOPY, WITH URETERAL STENT CHANGE;  Surgeon: Mario Hernandez MD;  Location: [facility];  Service: Urology;  Laterality: Right;    CYSTOSCOPY W/ URETERAL STENT PLACEMENT Right 10/7/2021    Procedure: CYSTOSCOPY, WITH URETERAL STENT INSERTION;  Surgeon: Mario Hernandez MD;  Location: [facility];  Service: Urology;  Laterality: Right;    CYSTOSCOPY W/ URETERAL STENT PLACEMENT N/A 1/27/2022    Procedure: CYSTOSCOPY, WITH URETERAL STENT INSERTION;  Surgeon: Mario Hernandez MD;  Location: [facility];  Service: Urology;  Laterality: N/A;    CYSTOSCOPY W/ URETERAL STENT PLACEMENT N/A 4/28/2022    Procedure: CYSTOSCOPY, WITH URETERAL STENT INSERTION;  Surgeon: Mario Hernandez MD;  Location: [facility];  Service: Urology;  Laterality: N/A;    CYSTOSCOPY W/ URETERAL STENT REMOVAL Right 11/16/2018    Procedure: CYSTOSCOPY, WITH URETERAL STENT REMOVAL;  Surgeon: Tremaine Victor MD;  Location: [facility];  Service: Urology;  Laterality: Right;    CYSTOSCOPY W/  URETERAL STENT REMOVAL Right 5/24/2019    Procedure: CYSTOSCOPY, WITH URETERAL STENT REMOVAL;  Surgeon: Tremaine Victor MD;  Location: [facility] OR;  Service: Urology;  Laterality: Right;    CYSTOSCOPY W/ URETERAL STENT REMOVAL Right 9/6/2019    Procedure: CYSTOSCOPY, WITH URETERAL STENT REMOVAL;  Surgeon: Tremaine Victor MD;  Location: [facility] OR;  Service: Urology;  Laterality: Right;    CYSTOSCOPY W/ URETERAL STENT REMOVAL Right 12/6/2019    Procedure: CYSTOSCOPY, WITH URETERAL STENT REMOVAL;  Surgeon: Tremaine Victor MD;  Location: [facility] OR;  Service: Urology;  Laterality: Right;    CYSTOSCOPY W/ URETERAL STENT REMOVAL Right 5/28/2020    Procedure: CYSTOSCOPY, WITH URETERAL STENT REMOVAL;  Surgeon: Mario Hernandez MD;  Location: [facility];  Service: Urology;  Laterality: Right;    CYSTOSCOPY W/ URETERAL STENT REMOVAL Right 11/5/2020    Procedure: CYSTOSCOPY, WITH URETERAL STENT REMOVAL;  Surgeon: Mario Hernandez MD;  Location: [facility];  Service: Urology;  Laterality: Right;    CYSTOSCOPY W/ URETERAL STENT REMOVAL Right 1/27/2022    Procedure: CYSTOSCOPY, WITH URETERAL STENT REMOVAL;  Surgeon: Mario Hernandez MD;  Location: [facility] OR;  Service: Urology;  Laterality: Right;    CYSTOSCOPY W/ URETERAL STENT REMOVAL N/A 4/28/2022    Procedure: CYSTOSCOPY, WITH URETERAL STENT REMOVAL;  Surgeon: Mario Hernandez MD;  Location: [facility];  Service: Urology;  Laterality: N/A;    cystoscopy with stent      HYSTERECTOMY  1965     Family History   Problem Relation Age of Onset    Heart disease Mother     Heart disease Father     Cancer Father     Diabetes Father     Heart disease Sister     Aneurysm Son      Social History     Tobacco Use    Smoking status: Former Smoker     Packs/day: 1.00     Years: 50.00     Pack years: 50.00     Types: Cigarettes     Quit date: 1/1/2019     Years since quitting: 3.5    Smokeless tobacco: Never Used   Substance Use Topics    Alcohol use: No    Drug use: No         Review of Systems:  Negative    OBJECTIVE:     Vital Signs (Most Recent):  Temp: 97.8 °F (36.6 °C) (08/04/22 0849)  Pulse: 64 (08/04/22 0849)  Resp: 17 (08/04/22 0849)  BP: (!) 158/72 (08/04/22 0850)  SpO2: 99 % (08/04/22 0849)    Inpatient Medications:  Current Facility-Administered Medications   Medication Dose Route Frequency Provider Last Rate Last Admin    cefazolin (ANCEF) 2 gram in dextrose 5% 50 mL IVPB (premix)  2 g Intravenous Once Mario Hernandez MD        diphenhydrAMINE injection 12.5 mg  12.5 mg Intravenous Q15 Min PRN Charlie Rod MD        HYDROmorphone injection 0.2 mg  0.2 mg Intravenous Q5 Min PRN Charlie Rod MD        meperidine injection 12.5 mg  12.5 mg Intravenous Q10 Min PRN Charlie Rod MD        ondansetron injection 4 mg  4 mg Intravenous Daily PRN Charlie Rod MD        oxyCODONE immediate release tablet 5 mg  5 mg Oral Q3H PRN Charlie Rod MD        sodium chloride 0.9% flush 10 mL  10 mL Intravenous PRN Charlie Rod MD         Facility-Administered Medications Ordered in Other Encounters   Medication Dose Route Frequency Provider Last Rate Last Admin    lactated ringers infusion   Intravenous Continuous Tremaine Victor MD 75 mL/hr at 05/24/19 0730 New Bag at 05/24/19 0730    lactated ringers infusion   Intravenous Continuous Tremaine Victor MD   New Bag at 12/06/19 0715          ASSESSMENT/PLAN:     Long history right hydronephrosis    Double-J stent exchange

## 2022-08-04 NOTE — ANESTHESIA PROCEDURE NOTES
Intubation    Date/Time: 8/4/2022 10:28 AM  Performed by: Jamar Ny CRNA  Authorized by: Oscar Padilla MD     Intubation:     Induction:  Intravenous    Intubated:  Postinduction    Mask Ventilation:  Easy mask    Attempts:  1    Attempted By:  CRNA    Method of Intubation:  Video laryngoscopy    Blade:  Cooper 3    Laryngeal View Grade: Grade I - full view of cords      Difficult Airway Encountered?: No      Complications:  None    Airway Device:  Oral endotracheal tube    Airway Device Size:  7.5    Style/Cuff Inflation:  Cuffed (inflated to minimal occlusive pressure)    Secured at:  The lips    Placement Verified By:  Capnometry and Revisualization with laryngoscopy    Complicating Factors:  None    Findings Post-Intubation:  BS equal bilateral

## 2022-12-14 NOTE — OP NOTE
Operative Note         SUMMARY     Surgery Date:  8/4/2022     Assistant:     Indications:  81-year-old female with persistent right hydronephrosis  Here for stent exchange      Pre-op Diagnosis:   Right hydronephrosis    Post-op Diagnosis:  Same    Procedure:  Cystoscopy with stent exchange    Anesthesia: General    Description of Procedure:  Patient brought to cystoscopy suite.  Placed in the cystoscopy position.  Patient is prepped and draped.  Anesthesia is given.  We enter the urinary bladder with the 21 fr cystoscope.   Stent was identified and removed  Placed a wire into the right kidney  We easily placed another stent  No lesions found within the bladder  Taken to recovery in good condition    Findings/Key Components:          Estimated Blood Loss:    None        
No

## (undated) DEVICE — CATHETER URETERAL WEDGE TIP 5F X 70CM

## (undated) DEVICE — GLOVE #7.5 BIOGEL 30475

## (undated) DEVICE — SYRINGE 30ML 302832

## (undated) DEVICE — SOLUTION IRRI H2O BOTTLE 1000ML

## (undated) DEVICE — UNDERGLOVE BIOGEL PI MICRO BLUE SZ 6.5

## (undated) DEVICE — TUBING CYSTO DOUBLE 654301

## (undated) DEVICE — SCRUB BACTOSHIELD 134439

## (undated) DEVICE — PACK CYSTOSCOPY III

## (undated) DEVICE — STRAP TABLE 5X72 M5173

## (undated) DEVICE — SOLUTION H2O IRRIGATION 3000ML R8006

## (undated) DEVICE — TUBING SUCTION 12' ST2003

## (undated) DEVICE — CATHETER URETERAL CONE TIP 5F X 70CM

## (undated) DEVICE — SOLUTION IRRI NS BOTTLE 1000ML R5200-01

## (undated) DEVICE — GUIDEWIRE URO STRGHT 3CM TIP.035X150CM